# Patient Record
Sex: FEMALE | Race: WHITE | Employment: FULL TIME | URBAN - METROPOLITAN AREA
[De-identification: names, ages, dates, MRNs, and addresses within clinical notes are randomized per-mention and may not be internally consistent; named-entity substitution may affect disease eponyms.]

---

## 2017-01-03 RX ORDER — NORGESTIMATE AND ETHINYL ESTRADIOL 7DAYSX3 28
1 KIT ORAL DAILY
Qty: 28 TABLET | Refills: 1 | Status: SHIPPED | OUTPATIENT
Start: 2017-01-03 | End: 2017-03-01 | Stop reason: SDUPTHER

## 2017-02-27 ENCOUNTER — TELEPHONE (OUTPATIENT)
Dept: OBGYN CLINIC | Age: 37
End: 2017-02-27

## 2017-03-08 ENCOUNTER — OFFICE VISIT (OUTPATIENT)
Dept: OBGYN | Facility: CLINIC | Age: 37
End: 2017-03-08

## 2017-03-08 VITALS
WEIGHT: 189.9 LBS | BODY MASS INDEX: 30.52 KG/M2 | SYSTOLIC BLOOD PRESSURE: 116 MMHG | DIASTOLIC BLOOD PRESSURE: 79 MMHG | HEART RATE: 91 BPM | HEIGHT: 66 IN

## 2017-03-08 DIAGNOSIS — Z01.818 PRE-OP EXAM: Primary | ICD-10-CM

## 2017-03-08 DIAGNOSIS — R10.2 ADNEXAL TENDERNESS, LEFT: ICD-10-CM

## 2017-03-08 PROCEDURE — 99213 OFFICE O/P EST LOW 20 MIN: CPT | Performed by: OBSTETRICS & GYNECOLOGY

## 2017-03-14 ENCOUNTER — HOSPITAL ENCOUNTER (OUTPATIENT)
Dept: ULTRASOUND IMAGING | Age: 37
Discharge: HOME | End: 2017-03-14
Payer: COMMERCIAL

## 2017-03-14 DIAGNOSIS — Z01.818 PRE-OP EXAM: ICD-10-CM

## 2017-03-14 DIAGNOSIS — R10.2 ADNEXAL TENDERNESS, LEFT: ICD-10-CM

## 2017-03-14 PROCEDURE — 76830 TRANSVAGINAL US NON-OB: CPT

## 2017-03-22 ENCOUNTER — TELEPHONE (OUTPATIENT)
Dept: OBGYN CLINIC | Age: 37
End: 2017-03-22

## 2017-04-06 ENCOUNTER — TELEPHONE (OUTPATIENT)
Dept: OBGYN CLINIC | Age: 37
End: 2017-04-06

## 2017-04-20 ENCOUNTER — OFFICE VISIT (OUTPATIENT)
Dept: FAMILY MEDICINE CLINIC | Age: 37
End: 2017-04-20
Payer: COMMERCIAL

## 2017-04-20 VITALS
HEIGHT: 66 IN | BODY MASS INDEX: 30.86 KG/M2 | TEMPERATURE: 97.4 F | HEART RATE: 97 BPM | OXYGEN SATURATION: 98 % | WEIGHT: 192 LBS | SYSTOLIC BLOOD PRESSURE: 124 MMHG | DIASTOLIC BLOOD PRESSURE: 82 MMHG

## 2017-04-20 DIAGNOSIS — F32.A DEPRESSION, UNSPECIFIED DEPRESSION TYPE: Primary | ICD-10-CM

## 2017-04-20 DIAGNOSIS — M54.41 CHRONIC LOW BACK PAIN WITH RIGHT-SIDED SCIATICA, UNSPECIFIED BACK PAIN LATERALITY: ICD-10-CM

## 2017-04-20 DIAGNOSIS — F41.9 ANXIETY: ICD-10-CM

## 2017-04-20 DIAGNOSIS — G89.29 CHRONIC LOW BACK PAIN WITH RIGHT-SIDED SCIATICA, UNSPECIFIED BACK PAIN LATERALITY: ICD-10-CM

## 2017-04-20 PROCEDURE — 96127 BRIEF EMOTIONAL/BEHAV ASSMT: CPT | Performed by: FAMILY MEDICINE

## 2017-04-20 PROCEDURE — 99213 OFFICE O/P EST LOW 20 MIN: CPT | Performed by: FAMILY MEDICINE

## 2017-04-20 RX ORDER — BUSPIRONE HYDROCHLORIDE 10 MG/1
10 TABLET ORAL 3 TIMES DAILY
Qty: 90 TABLET | Refills: 0 | Status: SHIPPED | OUTPATIENT
Start: 2017-04-20 | End: 2017-04-25

## 2017-04-20 RX ORDER — VENLAFAXINE HYDROCHLORIDE 150 MG/1
150 CAPSULE, EXTENDED RELEASE ORAL DAILY
Qty: 90 CAPSULE | Refills: 1 | Status: SHIPPED | OUTPATIENT
Start: 2017-04-20 | End: 2017-05-10 | Stop reason: SDUPTHER

## 2017-04-20 RX ORDER — MELOXICAM 15 MG/1
15 TABLET ORAL DAILY
Qty: 30 TABLET | Refills: 3 | Status: SHIPPED | OUTPATIENT
Start: 2017-04-20 | End: 2017-09-05 | Stop reason: SDUPTHER

## 2017-04-20 ASSESSMENT — PATIENT HEALTH QUESTIONNAIRE - PHQ9
8. MOVING OR SPEAKING SO SLOWLY THAT OTHER PEOPLE COULD HAVE NOTICED. OR THE OPPOSITE, BEING SO FIGETY OR RESTLESS THAT YOU HAVE BEEN MOVING AROUND A LOT MORE THAN USUAL: 1
1. LITTLE INTEREST OR PLEASURE IN DOING THINGS: 3
6. FEELING BAD ABOUT YOURSELF - OR THAT YOU ARE A FAILURE OR HAVE LET YOURSELF OR YOUR FAMILY DOWN: 3
5. POOR APPETITE OR OVEREATING: 3
2. FEELING DOWN, DEPRESSED OR HOPELESS: 3
9. THOUGHTS THAT YOU WOULD BE BETTER OFF DEAD, OR OF HURTING YOURSELF: 0
SUM OF ALL RESPONSES TO PHQ9 QUESTIONS 1 & 2: 6
10. IF YOU CHECKED OFF ANY PROBLEMS, HOW DIFFICULT HAVE THESE PROBLEMS MADE IT FOR YOU TO DO YOUR WORK, TAKE CARE OF THINGS AT HOME, OR GET ALONG WITH OTHER PEOPLE: 1
7. TROUBLE CONCENTRATING ON THINGS, SUCH AS READING THE NEWSPAPER OR WATCHING TELEVISION: 3
4. FEELING TIRED OR HAVING LITTLE ENERGY: 3
SUM OF ALL RESPONSES TO PHQ QUESTIONS 1-9: 22
3. TROUBLE FALLING OR STAYING ASLEEP: 3

## 2017-04-20 ASSESSMENT — ENCOUNTER SYMPTOMS
NAUSEA: 0
COUGH: 0
SHORTNESS OF BREATH: 0
VOMITING: 0

## 2017-04-25 ENCOUNTER — OFFICE VISIT (OUTPATIENT)
Dept: FAMILY MEDICINE CLINIC | Age: 37
End: 2017-04-25
Payer: COMMERCIAL

## 2017-04-25 VITALS
HEART RATE: 95 BPM | DIASTOLIC BLOOD PRESSURE: 80 MMHG | TEMPERATURE: 98.9 F | BODY MASS INDEX: 28.93 KG/M2 | HEIGHT: 66 IN | SYSTOLIC BLOOD PRESSURE: 132 MMHG | WEIGHT: 180 LBS

## 2017-04-25 DIAGNOSIS — F32.A DEPRESSION, UNSPECIFIED DEPRESSION TYPE: Primary | ICD-10-CM

## 2017-04-25 DIAGNOSIS — F41.9 ANXIETY: ICD-10-CM

## 2017-04-25 PROCEDURE — 99213 OFFICE O/P EST LOW 20 MIN: CPT | Performed by: FAMILY MEDICINE

## 2017-04-25 RX ORDER — ALPRAZOLAM 0.5 MG/1
0.5 TABLET ORAL NIGHTLY PRN
Qty: 25 TABLET | Refills: 0 | Status: SHIPPED | OUTPATIENT
Start: 2017-04-25 | End: 2017-05-24 | Stop reason: SDUPTHER

## 2017-04-25 ASSESSMENT — ENCOUNTER SYMPTOMS
VOMITING: 0
COUGH: 0
SHORTNESS OF BREATH: 0
NAUSEA: 0

## 2017-05-10 ENCOUNTER — OFFICE VISIT (OUTPATIENT)
Dept: FAMILY MEDICINE CLINIC | Age: 37
End: 2017-05-10
Payer: COMMERCIAL

## 2017-05-10 VITALS
DIASTOLIC BLOOD PRESSURE: 75 MMHG | BODY MASS INDEX: 30.86 KG/M2 | OXYGEN SATURATION: 93 % | SYSTOLIC BLOOD PRESSURE: 111 MMHG | HEART RATE: 85 BPM | HEIGHT: 66 IN | TEMPERATURE: 98 F | WEIGHT: 192 LBS | RESPIRATION RATE: 16 BRPM

## 2017-05-10 DIAGNOSIS — F32.A DEPRESSION, UNSPECIFIED DEPRESSION TYPE: Primary | ICD-10-CM

## 2017-05-10 PROCEDURE — 99213 OFFICE O/P EST LOW 20 MIN: CPT | Performed by: FAMILY MEDICINE

## 2017-05-10 RX ORDER — VENLAFAXINE HYDROCHLORIDE 37.5 MG/1
37.5 CAPSULE, EXTENDED RELEASE ORAL DAILY
Qty: 30 CAPSULE | Refills: 3 | Status: SHIPPED | OUTPATIENT
Start: 2017-05-10 | End: 2017-11-01 | Stop reason: SDUPTHER

## 2017-05-10 RX ORDER — VENLAFAXINE HYDROCHLORIDE 150 MG/1
150 CAPSULE, EXTENDED RELEASE ORAL DAILY
Qty: 90 CAPSULE | Refills: 1 | OUTPATIENT
Start: 2017-05-10 | End: 2017-11-01 | Stop reason: SDUPTHER

## 2017-05-10 ASSESSMENT — ENCOUNTER SYMPTOMS
NAUSEA: 0
SHORTNESS OF BREATH: 0
COUGH: 0
VOMITING: 0

## 2017-05-24 ENCOUNTER — OFFICE VISIT (OUTPATIENT)
Dept: FAMILY MEDICINE CLINIC | Age: 37
End: 2017-05-24
Payer: COMMERCIAL

## 2017-05-24 ENCOUNTER — OFFICE VISIT (OUTPATIENT)
Dept: BEHAVIORAL/MENTAL HEALTH CLINIC | Age: 37
End: 2017-05-24
Payer: COMMERCIAL

## 2017-05-24 VITALS
HEART RATE: 97 BPM | SYSTOLIC BLOOD PRESSURE: 137 MMHG | RESPIRATION RATE: 15 BRPM | WEIGHT: 197.2 LBS | BODY MASS INDEX: 34.94 KG/M2 | DIASTOLIC BLOOD PRESSURE: 89 MMHG | TEMPERATURE: 97.6 F | OXYGEN SATURATION: 98 % | HEIGHT: 63 IN

## 2017-05-24 DIAGNOSIS — F33.1 DEPRESSION, MAJOR, RECURRENT, MODERATE (HCC): Primary | ICD-10-CM

## 2017-05-24 DIAGNOSIS — F32.A DEPRESSION, UNSPECIFIED DEPRESSION TYPE: Primary | ICD-10-CM

## 2017-05-24 DIAGNOSIS — F41.9 ANXIETY: ICD-10-CM

## 2017-05-24 PROCEDURE — 90791 PSYCH DIAGNOSTIC EVALUATION: CPT | Performed by: SOCIAL WORKER

## 2017-05-24 PROCEDURE — 99213 OFFICE O/P EST LOW 20 MIN: CPT | Performed by: FAMILY MEDICINE

## 2017-05-24 RX ORDER — ALPRAZOLAM 0.5 MG/1
0.5 TABLET ORAL NIGHTLY PRN
Qty: 25 TABLET | Refills: 0 | Status: SHIPPED | OUTPATIENT
Start: 2017-05-24 | End: 2017-06-23

## 2017-05-24 ASSESSMENT — ENCOUNTER SYMPTOMS
VOMITING: 0
NAUSEA: 0
SHORTNESS OF BREATH: 0
COUGH: 0

## 2017-05-24 ASSESSMENT — PATIENT HEALTH QUESTIONNAIRE - PHQ9
9. THOUGHTS THAT YOU WOULD BE BETTER OFF DEAD, OR OF HURTING YOURSELF: 0
3. TROUBLE FALLING OR STAYING ASLEEP: 2
SUM OF ALL RESPONSES TO PHQ9 QUESTIONS 1 & 2: 4
5. POOR APPETITE OR OVEREATING: 2
7. TROUBLE CONCENTRATING ON THINGS, SUCH AS READING THE NEWSPAPER OR WATCHING TELEVISION: 2
SUM OF ALL RESPONSES TO PHQ QUESTIONS 1-9: 16
8. MOVING OR SPEAKING SO SLOWLY THAT OTHER PEOPLE COULD HAVE NOTICED. OR THE OPPOSITE, BEING SO FIGETY OR RESTLESS THAT YOU HAVE BEEN MOVING AROUND A LOT MORE THAN USUAL: 1
6. FEELING BAD ABOUT YOURSELF - OR THAT YOU ARE A FAILURE OR HAVE LET YOURSELF OR YOUR FAMILY DOWN: 3
1. LITTLE INTEREST OR PLEASURE IN DOING THINGS: 2
2. FEELING DOWN, DEPRESSED OR HOPELESS: 2
4. FEELING TIRED OR HAVING LITTLE ENERGY: 2
10. IF YOU CHECKED OFF ANY PROBLEMS, HOW DIFFICULT HAVE THESE PROBLEMS MADE IT FOR YOU TO DO YOUR WORK, TAKE CARE OF THINGS AT HOME, OR GET ALONG WITH OTHER PEOPLE: 2

## 2017-06-07 ENCOUNTER — INITIAL CONSULT (OUTPATIENT)
Dept: BEHAVIORAL/MENTAL HEALTH CLINIC | Age: 37
End: 2017-06-07
Payer: COMMERCIAL

## 2017-06-07 DIAGNOSIS — F33.1 DEPRESSION, MAJOR, RECURRENT, MODERATE (HCC): Primary | ICD-10-CM

## 2017-06-07 PROCEDURE — 90837 PSYTX W PT 60 MINUTES: CPT | Performed by: SOCIAL WORKER

## 2017-06-21 ENCOUNTER — INITIAL CONSULT (OUTPATIENT)
Dept: BEHAVIORAL/MENTAL HEALTH CLINIC | Age: 37
End: 2017-06-21
Payer: COMMERCIAL

## 2017-06-21 DIAGNOSIS — F33.1 DEPRESSION, MAJOR, RECURRENT, MODERATE (HCC): Primary | ICD-10-CM

## 2017-06-21 PROCEDURE — 90837 PSYTX W PT 60 MINUTES: CPT | Performed by: SOCIAL WORKER

## 2017-08-01 ENCOUNTER — OFFICE VISIT (OUTPATIENT)
Dept: FAMILY MEDICINE CLINIC | Age: 37
End: 2017-08-01
Payer: COMMERCIAL

## 2017-08-01 VITALS
HEART RATE: 80 BPM | WEIGHT: 204.4 LBS | SYSTOLIC BLOOD PRESSURE: 133 MMHG | RESPIRATION RATE: 20 BRPM | TEMPERATURE: 98.3 F | HEIGHT: 66 IN | BODY MASS INDEX: 32.85 KG/M2 | DIASTOLIC BLOOD PRESSURE: 86 MMHG

## 2017-08-01 DIAGNOSIS — F32.A ANXIETY AND DEPRESSION: Primary | ICD-10-CM

## 2017-08-01 DIAGNOSIS — F41.9 ANXIETY AND DEPRESSION: Primary | ICD-10-CM

## 2017-08-01 PROCEDURE — 99213 OFFICE O/P EST LOW 20 MIN: CPT | Performed by: FAMILY MEDICINE

## 2017-08-01 RX ORDER — ALPRAZOLAM 0.5 MG/1
0.5 TABLET ORAL NIGHTLY PRN
COMMUNITY
End: 2017-08-01 | Stop reason: SDUPTHER

## 2017-08-01 RX ORDER — ALPRAZOLAM 0.5 MG/1
0.5 TABLET ORAL NIGHTLY PRN
Qty: 25 TABLET | Refills: 0 | Status: SHIPPED | OUTPATIENT
Start: 2017-08-01 | End: 2017-11-01 | Stop reason: SDUPTHER

## 2017-08-01 ASSESSMENT — ENCOUNTER SYMPTOMS
NAUSEA: 0
COUGH: 0
VOMITING: 0
SHORTNESS OF BREATH: 0

## 2017-09-05 DIAGNOSIS — M54.41 CHRONIC LOW BACK PAIN WITH RIGHT-SIDED SCIATICA, UNSPECIFIED BACK PAIN LATERALITY: Primary | ICD-10-CM

## 2017-09-05 DIAGNOSIS — G89.29 CHRONIC LOW BACK PAIN WITH RIGHT-SIDED SCIATICA, UNSPECIFIED BACK PAIN LATERALITY: Primary | ICD-10-CM

## 2017-09-05 RX ORDER — MELOXICAM 15 MG/1
15 TABLET ORAL DAILY PRN
Qty: 30 TABLET | Refills: 3 | Status: SHIPPED | OUTPATIENT
Start: 2017-09-05 | End: 2017-11-01 | Stop reason: SDUPTHER

## 2017-09-05 RX ORDER — ALPRAZOLAM 0.5 MG/1
0.5 TABLET ORAL NIGHTLY PRN
Qty: 25 TABLET | Refills: 0 | OUTPATIENT
Start: 2017-09-05

## 2017-11-01 ENCOUNTER — OFFICE VISIT (OUTPATIENT)
Dept: FAMILY MEDICINE CLINIC | Age: 37
End: 2017-11-01

## 2017-11-01 ENCOUNTER — HOSPITAL ENCOUNTER (OUTPATIENT)
Age: 37
Setting detail: SPECIMEN
Discharge: HOME | End: 2017-11-01
Payer: COMMERCIAL

## 2017-11-01 VITALS
DIASTOLIC BLOOD PRESSURE: 80 MMHG | BODY MASS INDEX: 32.68 KG/M2 | SYSTOLIC BLOOD PRESSURE: 128 MMHG | HEIGHT: 67 IN | OXYGEN SATURATION: 97 % | TEMPERATURE: 96.2 F | WEIGHT: 208.2 LBS | RESPIRATION RATE: 20 BRPM | HEART RATE: 91 BPM

## 2017-11-01 DIAGNOSIS — G89.29 CHRONIC LOW BACK PAIN WITH RIGHT-SIDED SCIATICA, UNSPECIFIED BACK PAIN LATERALITY: ICD-10-CM

## 2017-11-01 DIAGNOSIS — F41.9 ANXIETY AND DEPRESSION: Primary | ICD-10-CM

## 2017-11-01 DIAGNOSIS — M54.41 CHRONIC LOW BACK PAIN WITH RIGHT-SIDED SCIATICA, UNSPECIFIED BACK PAIN LATERALITY: ICD-10-CM

## 2017-11-01 DIAGNOSIS — F32.A ANXIETY AND DEPRESSION: Primary | ICD-10-CM

## 2017-11-01 DIAGNOSIS — Z13.31 POSITIVE DEPRESSION SCREENING: ICD-10-CM

## 2017-11-01 LAB
AMPHETAMINE SCREEN URINE: NEGATIVE
AMPHETAMINE SCREEN, URINE: NORMAL
BARBITURATE SCREEN URINE: NEGATIVE
BARBITURATE SCREEN, URINE: NORMAL
BENZODIAZEPINE SCREEN, URINE: NEGATIVE
BENZODIAZEPINE SCREEN, URINE: NORMAL
BUPRENORPHINE URINE: NORMAL
CANNABINOID SCREEN URINE: NEGATIVE
COCAINE METABOLITE SCREEN URINE: NORMAL
COCAINE METABOLITE, URINE: NEGATIVE
MDMA URINE: NORMAL
MDMA URINE: NORMAL
METHADONE SCREEN, URINE: NEGATIVE
METHADONE SCREEN, URINE: NORMAL
METHAMPHETAMINE, URINE: NORMAL
METHAMPHETAMINE, URINE: NORMAL
OPIATE SCREEN URINE: NORMAL
OPIATES, URINE: NEGATIVE
OXYCODONE SCREEN URINE: NEGATIVE
OXYCODONE SCREEN URINE: NORMAL
PHENCYCLIDINE SCREEN URINE: NORMAL
PHENCYCLIDINE, URINE: NEGATIVE
PROPOXYPHENE SCREEN, URINE: NORMAL
PROPOXYPHENE, URINE: NORMAL
TEST INFORMATION: NORMAL
THC: NORMAL
TRICYCLIC ANTIDEPRESSANTS, UR: NORMAL
TRICYCLIC ANTIDEPRESSANTS, UR: NORMAL

## 2017-11-01 PROCEDURE — 99213 OFFICE O/P EST LOW 20 MIN: CPT | Performed by: FAMILY MEDICINE

## 2017-11-01 PROCEDURE — 80305 DRUG TEST PRSMV DIR OPT OBS: CPT | Performed by: FAMILY MEDICINE

## 2017-11-01 RX ORDER — ALPRAZOLAM 0.5 MG/1
0.5 TABLET ORAL NIGHTLY PRN
Qty: 10 TABLET | Refills: 0 | Status: SHIPPED | OUTPATIENT
Start: 2017-11-01 | End: 2018-01-31 | Stop reason: ALTCHOICE

## 2017-11-01 RX ORDER — MELOXICAM 15 MG/1
15 TABLET ORAL DAILY PRN
Qty: 30 TABLET | Refills: 3 | Status: SHIPPED | OUTPATIENT
Start: 2017-11-01 | End: 2018-03-12 | Stop reason: SDUPTHER

## 2017-11-01 RX ORDER — VENLAFAXINE HYDROCHLORIDE 37.5 MG/1
37.5 CAPSULE, EXTENDED RELEASE ORAL DAILY
Qty: 30 CAPSULE | Refills: 3 | Status: SHIPPED | OUTPATIENT
Start: 2017-11-01 | End: 2018-03-12 | Stop reason: SDUPTHER

## 2017-11-01 RX ORDER — VENLAFAXINE HYDROCHLORIDE 150 MG/1
150 CAPSULE, EXTENDED RELEASE ORAL DAILY
Qty: 90 CAPSULE | Refills: 1 | Status: SHIPPED | OUTPATIENT
Start: 2017-11-01 | End: 2018-04-03 | Stop reason: SDUPTHER

## 2017-11-01 ASSESSMENT — ENCOUNTER SYMPTOMS: BACK PAIN: 0

## 2017-11-01 ASSESSMENT — PATIENT HEALTH QUESTIONNAIRE - PHQ9
SUM OF ALL RESPONSES TO PHQ9 QUESTIONS 1 & 2: 3
SUM OF ALL RESPONSES TO PHQ QUESTIONS 1-9: 3
2. FEELING DOWN, DEPRESSED OR HOPELESS: 1
1. LITTLE INTEREST OR PLEASURE IN DOING THINGS: 2

## 2017-11-01 NOTE — PROGRESS NOTES
Chief Complaint   Patient presents with    Anxiety    Depression         [address] [facility]  here today for follow up on chronic medical problems, go over labs and/or diagnostic studies, and medication refills. Anxiety and Depression      HPI:Patient is here for follow-up on anxiety and depression, on Effexor and Xanax. Patient reports she was getting Xanax from her PCP and 25-30 tablets a month and takes only as needed. Patient reports she takes Xanax) for her sleep. She has tried multiple medications in the past and they did not work. She has tried trazodone, Zoloft, Lunesta and melatonin. Nothing worked for her. Patient reports she is getting under lot of stress and is also facing divorce. -She walks in rehab as a nurse, and knows about these medications and their side effects. Patient reports she has no insurance at this point and she cannot afford to seeing behavioral therapist and psychiatrist at this point. She denies any suicide or homicidal thoughts. /80   Pulse 91   Temp 96.2 °F (35.7 °C) (Tympanic)   Resp 20   Ht 5' 6.5\" (1.689 m)   Wt 208 lb 3.2 oz (94.4 kg)   LMP 10/11/2017 (Exact Date)   SpO2 97%   BMI 33.10 kg/m²   Body mass index is 33.1 kg/m². Wt Readings from Last 3 Encounters:   11/01/17 208 lb 3.2 oz (94.4 kg)   08/01/17 204 lb 6.4 oz (92.7 kg)   05/24/17 197 lb 3.2 oz (89.4 kg)        []Negative depression screening. PHQ Scores 11/1/2017 5/24/2017 4/20/2017 11/11/2015 6/22/2015 3/20/2015   PHQ2 Score 3 4 6 0 3 6   PHQ9 Score 3 16 22 0 13 18      []1-4 = Minimal depression   [x]5-9 = Mild depression   []10-14 = Moderate depression   []15-19 = Moderately severe depression   []20-27 = Severe depression    Discussed testing with the patient and all questions fully answered.     Hospital Outpatient Visit on 11/11/2015   Component Date Value Ref Range Status    Cytology Report 11/23/2015    Final                    Value:(NOTE)  [id number]  [facility]  ANATOMIC PATHOLOGY  [address] [place] [address], [facility] [address]. [facility], [address]  [phone]  Fax: [phone] [address] REPORT    Patient Name: Elisabet Corbett  MR#: [id number]  Specimen #[id number]  Source:  1: CERVICAL MATERIAL, (THIN PREP VIAL)    Clinical History  Z01.419 Routine gyn exam without abnormal findings  Z11.51 Encounter for screening for HPV  Regardless of pap result, high risk HPV DNA testing is requested  Date of prior pap: 2012  No prior abnormal findings  LMP:  10/23/15  INTERPRETATION    CERVICAL MATERIAL, (THIN PREP VIAL):  Specimen Adequacy:      Satisfactory for evaluation.      - Endocervical/transformation zone component present. - Partially obscuring exudate. Descriptive Diagnosis:      Negative for intraepithelial lesion or malignancy. Comments:      High Risk HPV testing was ordered. Cytotechnologist:   WILMER Hurley(ASCP)  **Electronically Signed Out**                            mk/11/23/2015          Procedure/Addendum  HPV Procedure Report     Date Ordered:     11/12/2015     Status:  Signed Out      Date Complete:     11/12/2015     By: WILMER York(ASCP)      Date Reported:     11/23/2015       INTERPRETATION  Roche HPV DNA High Risk                                 HPV Sample               Thin Prep                    (Ref Range)  HPV Type 16               Not Detected                    (Not  Detected)  HPV Type 18               Not Detected                    (Not  Detected)  Other High Risk HPV     Not Detected                    (Not Detected)      This test amplifies and detects DNA of 14 high-risk HPV types  associated with cervical cancer and its precursor lesions (HPV types  16, 18, 31, 33, 35, 39, 45, 51, 52, 56, 58, 59, 66, and 68). Sensitivity may be affected by specimen collection methods, stage of  infection, and the presence of interfering substances.   Results should  be interpreted in conjunction with other available laboratory and  cl                          inical data. A negative high-risk HPV result does not exclude the  possibility of future cytologic HSIL or underlying CIN2-3 or cancer. This test is intended for medical purposes only and is not valid for  the evaluation of suspected sexual abuse or for other forensic  purposes.  HPV SOURCE 11/16/2015 CERVICAL MATERIAL   Final    HPV Sample 11/16/2015 THIN PREP   Final    HPV, Genotype 16 11/16/2015 Not Detected  NOTDET Final    HPV, Genotype 18 11/16/2015 Not Detected  NOTDET Final    HPV, High Risk Other 11/16/2015 Not Detected  NOTDET Final    HPV, Interpretation 11/16/2015        Final    Comment: This test amplifies and detects DNA of 14 high-risk HPV types associated with   cervical cancer and its precursor lesions (HPV types 16,18, 31, 33, 35, 39, 45,   51, 52, 56, 58, 59, 66, and 68). Sensitivity may be affected by specimen collection methods, stage of infection,   and the presence of interfering substances. Results should be interpreted in conjunction with other available laboratory and   clinical data. A negative high-risk HPV result does not exclude the possibility of future   cytologic HSIL or underlying CIN2-3 or cancer. This test is intended for medical purposes only and is not valid for the   evaluation of suspected sexual abuse or for other forensic purposes.   Performed at [facility] [address], [address] [phone]           Most recent labs reviewed:     Lab Results   Component Value Date    WBC 4.2 09/02/2015    HGB 13.1 09/02/2015    HCT 37.9 09/02/2015    MCV 87.9 09/02/2015     09/02/2015       Lab Results   Component Value Date     09/02/2015    K 4.0 09/02/2015     09/02/2015    CO2 25 09/02/2015    BUN 11 09/02/2015    CREATININE 0.69 09/02/2015    GLUCOSE 98 09/02/2015    CALCIUM 9.0 09/02/2015        Lab Results   Component Value Date ALT 12 09/02/2015    AST 12 09/02/2015    ALKPHOS 49 09/02/2015    BILITOT 0.42 09/02/2015       Lab Results   Component Value Date    TSH 1.12 08/17/2015       Lab Results   Component Value Date    CHOL 158 09/02/2015     Lab Results   Component Value Date    TRIG 83 09/02/2015     Lab Results   Component Value Date    HDL 41 09/02/2015     Lab Results   Component Value Date    LDLCHOLESTEROL 100 09/02/2015     Lab Results   Component Value Date    VLDL NOT REPORTED 09/02/2015     Lab Results   Component Value Date    CHOLHDLRATIO 3.9 09/02/2015       Lab Results   Component Value Date    LABA1C 5.1 09/02/2015       No results found for: [id number]    No results found for: FOLATE    No results found for: IRON, TIBC, FERRITIN    No results found for: VITD25          Current Outpatient Prescriptions   Medication Sig Dispense Refill    ALPRAZolam (XANAX) 0.5 MG tablet Take 1 tablet by mouth nightly as needed for Sleep or Anxiety 10 tablet 0    meloxicam (MOBIC) 15 MG tablet Take 1 tablet by mouth daily as needed for Pain (back pain) 30 tablet 3    venlafaxine (EFFEXOR XR) 150 MG extended release capsule Take 1 capsule by mouth daily Along with 37.5 mg in morning 90 capsule 1    venlafaxine (EFFEXOR XR) 37.5 MG extended release capsule Take 1 capsule by mouth daily In morning along with 150mg Xr in evening 30 capsule 3    TRI-SPRINTEC 0.18/0.215/0.25 MG-35 MCG TABS TAKE ONE TABLET BY MOUTH DAILY 28 tablet 11     No current facility-administered medications for this visit. Social History     Social History    Marital status: Legally      Spouse name: N/A    Number of children: N/A    Years of education: N/A     Occupational History    Not on file.      Social History Main Topics    Smoking status: Never Smoker    Smokeless tobacco: Never Used    Alcohol use No    Drug use: No    Sexual activity: Yes     Partners: Male     Other Topics Concern    Not on file     Social History extremities. No tenderness to   palpation of the ribs, long bones, or spine. NEUROLOGIC: Cranial nerves II through XII grossly intact. No focal deficits are noted. ASSESSMENT AND PLAN      1. Anxiety and depression  -Discussed in detail with patient, about benzodiazepines and independence. Patient reports she is not dependent and she takes only as needed. Discussed with patient I will support long-term will get the insurance and see his psychiatrist.  Decrease Xanax to 10 tablets for one month as needed. Continue other medications. Drug screen is negative for benzodiazepines or any other illicit drugs. We'll send 1 for lab. - ALPRAZolam (XANAX) 0.5 MG tablet; Take 1 tablet by mouth nightly as needed for Sleep or Anxiety  Dispense: 10 tablet; Refill: 0  - venlafaxine (EFFEXOR XR) 150 MG extended release capsule; Take 1 capsule by mouth daily Along with 37.5 mg in morning  Dispense: 90 capsule; Refill: 1  - venlafaxine (EFFEXOR XR) 37.5 MG extended release capsule; Take 1 capsule by mouth daily In morning along with 150mg Xr in evening  Dispense: 30 capsule; Refill: 3  - POCT Rapid Drug Screen  - Urine Drug Screen    2. Chronic low back pain with right-sided sciatica, unspecified back pain laterality  -Stable continue Mobic as needed for pain  - meloxicam (MOBIC) 15 MG tablet; Take 1 tablet by mouth daily as needed for Pain (back pain)  Dispense: 30 tablet;  Refill: 3        Orders Placed This Encounter   Procedures    Urine Drug Screen    POCT Rapid Drug Screen         Medications Discontinued During This Encounter   Medication Reason    ALPRAZolam (XANAX) 0.5 MG tablet Reorder    meloxicam (MOBIC) 15 MG tablet Reorder    venlafaxine (EFFEXOR XR) 150 MG extended release capsule Reorder    venlafaxine (EFFEXOR XR) 37.5 MG extended release capsule Reorder       Dyan Chambers received counseling on the following healthy behaviors: nutrition, exercise, medication adherence and tobacco cessation  Reviewed in this encounter. Return in about 3 months (around 2/1/2018) for for anxiety , depression , psych refferal , sign contract . Patient was seen with total face to face time of 15 minutes. More than 50% of this visit was counseling and education. Future Appointments  Date Time Provider Peggy Rivka   1/31/2018 9:30 AM [facility]     This note was completed by using the assistance of a speech-recognition program. However, inadvertent computerized transcription errors may be present. Although every effort was made to ensure accuracy, no guarantees can be provided that every mistake has been identified and corrected by editing. Electronically signed by Sweta Cabello MD on 11/1/2017  11:07 AM              On the basis of positive PHQ-9 screening (PHQ-9 Total Score: 3), the following plan was implemented: referral for grief counseling provided and pt is on treatment . Patient will follow-up in 3 month(s) with PCP.

## 2018-01-31 ENCOUNTER — OFFICE VISIT (OUTPATIENT)
Dept: FAMILY MEDICINE CLINIC | Age: 38
End: 2018-01-31
Payer: MEDICARE

## 2018-01-31 VITALS
TEMPERATURE: 98.4 F | SYSTOLIC BLOOD PRESSURE: 133 MMHG | DIASTOLIC BLOOD PRESSURE: 84 MMHG | HEIGHT: 67 IN | OXYGEN SATURATION: 98 % | BODY MASS INDEX: 33.27 KG/M2 | HEART RATE: 82 BPM | RESPIRATION RATE: 20 BRPM | WEIGHT: 212 LBS

## 2018-01-31 DIAGNOSIS — L02.92 BOILS: ICD-10-CM

## 2018-01-31 DIAGNOSIS — F41.9 ANXIETY AND DEPRESSION: Primary | ICD-10-CM

## 2018-01-31 DIAGNOSIS — F32.A ANXIETY AND DEPRESSION: Primary | ICD-10-CM

## 2018-01-31 DIAGNOSIS — Z13.31 POSITIVE DEPRESSION SCREENING: ICD-10-CM

## 2018-01-31 PROCEDURE — 99213 OFFICE O/P EST LOW 20 MIN: CPT | Performed by: FAMILY MEDICINE

## 2018-01-31 PROCEDURE — G8417 CALC BMI ABV UP PARAM F/U: HCPCS | Performed by: FAMILY MEDICINE

## 2018-01-31 PROCEDURE — G8427 DOCREV CUR MEDS BY ELIG CLIN: HCPCS | Performed by: FAMILY MEDICINE

## 2018-01-31 PROCEDURE — 1036F TOBACCO NON-USER: CPT | Performed by: FAMILY MEDICINE

## 2018-01-31 PROCEDURE — G8431 POS CLIN DEPRES SCRN F/U DOC: HCPCS | Performed by: FAMILY MEDICINE

## 2018-01-31 PROCEDURE — G8484 FLU IMMUNIZE NO ADMIN: HCPCS | Performed by: FAMILY MEDICINE

## 2018-01-31 RX ORDER — ARIPIPRAZOLE 5 MG/1
5 TABLET ORAL DAILY
Qty: 30 TABLET | Refills: 3 | Status: SHIPPED | OUTPATIENT
Start: 2018-01-31 | End: 2018-04-03 | Stop reason: SDUPTHER

## 2018-01-31 RX ORDER — DOXYCYCLINE HYCLATE 100 MG
100 TABLET ORAL 2 TIMES DAILY
Qty: 20 TABLET | Refills: 0 | Status: SHIPPED | OUTPATIENT
Start: 2018-01-31 | End: 2018-09-17 | Stop reason: ALTCHOICE

## 2018-01-31 ASSESSMENT — PATIENT HEALTH QUESTIONNAIRE - PHQ9
SUM OF ALL RESPONSES TO PHQ QUESTIONS 1-9: 22
7. TROUBLE CONCENTRATING ON THINGS, SUCH AS READING THE NEWSPAPER OR WATCHING TELEVISION: 3
5. POOR APPETITE OR OVEREATING: 3
SUM OF ALL RESPONSES TO PHQ9 QUESTIONS 1 & 2: 5
10. IF YOU CHECKED OFF ANY PROBLEMS, HOW DIFFICULT HAVE THESE PROBLEMS MADE IT FOR YOU TO DO YOUR WORK, TAKE CARE OF THINGS AT HOME, OR GET ALONG WITH OTHER PEOPLE: 2
9. THOUGHTS THAT YOU WOULD BE BETTER OFF DEAD, OR OF HURTING YOURSELF: 0
4. FEELING TIRED OR HAVING LITTLE ENERGY: 3
1. LITTLE INTEREST OR PLEASURE IN DOING THINGS: 2
8. MOVING OR SPEAKING SO SLOWLY THAT OTHER PEOPLE COULD HAVE NOTICED. OR THE OPPOSITE, BEING SO FIGETY OR RESTLESS THAT YOU HAVE BEEN MOVING AROUND A LOT MORE THAN USUAL: 2
2. FEELING DOWN, DEPRESSED OR HOPELESS: 3
3. TROUBLE FALLING OR STAYING ASLEEP: 3
6. FEELING BAD ABOUT YOURSELF - OR THAT YOU ARE A FAILURE OR HAVE LET YOURSELF OR YOUR FAMILY DOWN: 3

## 2018-01-31 ASSESSMENT — ENCOUNTER SYMPTOMS
BACK PAIN: 0
COUGH: 0
SHORTNESS OF BREATH: 0
SINUS PAIN: 0
RHINORRHEA: 0
WHEEZING: 0
PHOTOPHOBIA: 0

## 2018-01-31 NOTE — PROGRESS NOTES
Comment:       (Positive cutoff 1000 ng/mL)                  Barbiturate Screen, Ur 11/01/2017 NEGATIVE  NEG Final    Comment:       (Positive cutoff 200 ng/mL)                  Benzodiazepine Screen, Urine 11/01/2017 NEGATIVE  NEG Final    Comment:       (Positive cutoff 200 ng/mL)                  Cocaine Metabolite, Urine 11/01/2017 NEGATIVE  NEG Final    Comment:       (Positive cutoff 300 ng/mL)                  Methadone Screen, Urine 11/01/2017 NEGATIVE  NEG Final    Comment:       (Positive cutoff 300 ng/mL)                  Opiates, Urine 11/01/2017 NEGATIVE  NEG Final    Comment:       (Positive cutoff 300 ng/mL)                  Phencyclidine, Urine 11/01/2017 NEGATIVE  NEG Final    Comment:       (Positive cutoff 25 ng/mL)                  Propoxyphene, Urine 11/01/2017 NOT REPORTED  NEG Final    Cannabinoid Scrn, Ur 11/01/2017 NEGATIVE  NEG Final    Comment:       (Positive cutoff 50 ng/mL)                  Oxycodone Screen, Ur 11/01/2017 NEGATIVE  NEG Final    Comment:       (Positive cutoff 100 ng/mL)                  Methamphetamine, Urine 11/01/2017 NOT REPORTED  NEG Final    Tricyclic Antidepressants, Ur 11/01/2017 NOT REPORTED  NEG Final    MDMA URINE 11/01/2017 NOT REPORTED  NEG Final    Buprenorphine Urine 11/01/2017 NOT REPORTED  NEG Final    Test Information 11/01/2017 Assay provides medical screening only. The absence of expected drug(s) and/or   Final    Comment:  metabolite(s) may indicate diluted or adulterated urine, limitations of testing   or timing of collection. Testing for legal purposes should be confirmed by another method. To request   confirmation of test result, please call the lab within 7 days of sample   submission.   [facility] [address] [place] [address], [address] [place] [address] [phone]           Most recent labs reviewed:     Lab Results   Component Value Date    WBC 4.2 09/02/2015    HGB 13.1 09/02/2015    HCT 37.9 09/02/2015    MCV 87.9 09/02/2015     Clinical Depression with a Documented Follow-up Plan          Medications Discontinued During This Encounter   Medication Reason    ALPRAZolam (XANAX) 0.5 MG tablet Therapy completed       Tatiana Holguin received counseling on the following healthy behaviors: nutrition, exercise and medication adherence  Reviewed prior labs and health maintenance  Continue current medications, diet and exercise. Discussed use, benefit, and side effects of prescribed medications. Barriers to medication compliance addressed. Patient given educational materials - see patient instructions  Was a self-tracking handout given in paper form or via mysportgroupt? Yes    Requested Prescriptions     Signed Prescriptions Disp Refills    ARIPiprazole (ABILIFY) 5 MG tablet 30 tablet 3     Sig: Take 1 tablet by mouth daily    doxycycline hyclate (VIBRA-TABS) 100 MG tablet 20 tablet 0     Sig: Take 1 tablet by mouth 2 times daily       All patient questions answered. Patient voiced understanding. Quality Measures    Body mass index is 33.71 kg/m². Elevated. Weight control planned discussed Healthy diet and regular exercise. BP: 133/84 Blood pressure is normal. Treatment plan consists of No treatment change needed. Lab Results   Component Value Date    LDLCHOLESTEROL 100 09/02/2015    (goal LDL reduction with dx if diabetes is 50% LDL reduction)      PHQ Scores 1/31/2018 11/1/2017 5/24/2017 4/20/2017 11/11/2015 6/22/2015 3/20/2015   PHQ2 Score 5 3 4 6 0 3 6   PHQ9 Score 22 3 16 22 0 13 18     Interpretation of Total Score Depression Severity: 1-4 = Minimal depression, 5-9 = Mild depression, 10-14 = Moderate depression, 15-19 = Moderately severe depression, 20-27 = Severe depression      The patient's past medical, surgical, social, and family history as well as her   current medications and allergies were reviewed as documented in today's encounter.       Medications, labs, diagnostic studies, consultations and follow-up as documented in this

## 2018-03-12 DIAGNOSIS — G89.29 CHRONIC LOW BACK PAIN WITH RIGHT-SIDED SCIATICA, UNSPECIFIED BACK PAIN LATERALITY: ICD-10-CM

## 2018-03-12 DIAGNOSIS — M54.41 CHRONIC LOW BACK PAIN WITH RIGHT-SIDED SCIATICA, UNSPECIFIED BACK PAIN LATERALITY: ICD-10-CM

## 2018-03-12 DIAGNOSIS — F41.9 ANXIETY AND DEPRESSION: ICD-10-CM

## 2018-03-12 DIAGNOSIS — F32.A ANXIETY AND DEPRESSION: ICD-10-CM

## 2018-03-12 RX ORDER — VENLAFAXINE HYDROCHLORIDE 37.5 MG/1
37.5 CAPSULE, EXTENDED RELEASE ORAL DAILY
Qty: 30 CAPSULE | Refills: 3 | Status: SHIPPED | OUTPATIENT
Start: 2018-03-12 | End: 2018-04-03 | Stop reason: DRUGHIGH

## 2018-03-12 RX ORDER — MELOXICAM 15 MG/1
15 TABLET ORAL DAILY PRN
Qty: 30 TABLET | Refills: 3 | Status: SHIPPED | OUTPATIENT
Start: 2018-03-12 | End: 2018-07-20 | Stop reason: SDUPTHER

## 2018-03-12 NOTE — TELEPHONE ENCOUNTER
From: Ronda Barron  Sent: 3/12/2018 10:35 AM EDT  Subject: Medication Renewal Request    Andreas Calvin English would like a refill of the following medications:     meloxicam (MOBIC) 15 MG tablet Francisca Rebolledo MD]     venlafaxine (EFFEXOR XR) 37.5 MG extended release capsule Francisca Rebolledo MD]    Preferred pharmacy: [address] [place] [address], [address] [place] [address] [phone] - F [phone]    Comment:

## 2018-04-03 ENCOUNTER — OFFICE VISIT (OUTPATIENT)
Dept: FAMILY MEDICINE CLINIC | Age: 38
End: 2018-04-03
Payer: MEDICARE

## 2018-04-03 ENCOUNTER — OFFICE VISIT (OUTPATIENT)
Dept: BEHAVIORAL/MENTAL HEALTH CLINIC | Age: 38
End: 2018-04-03
Payer: MEDICARE

## 2018-04-03 VITALS
HEART RATE: 103 BPM | DIASTOLIC BLOOD PRESSURE: 87 MMHG | SYSTOLIC BLOOD PRESSURE: 127 MMHG | BODY MASS INDEX: 34.68 KG/M2 | HEIGHT: 66 IN | WEIGHT: 215.8 LBS

## 2018-04-03 VITALS
WEIGHT: 216 LBS | HEART RATE: 94 BPM | BODY MASS INDEX: 34.86 KG/M2 | RESPIRATION RATE: 18 BRPM | SYSTOLIC BLOOD PRESSURE: 125 MMHG | DIASTOLIC BLOOD PRESSURE: 80 MMHG

## 2018-04-03 DIAGNOSIS — F33.41 RECURRENT MAJOR DEPRESSIVE DISORDER, IN PARTIAL REMISSION (HCC): ICD-10-CM

## 2018-04-03 DIAGNOSIS — Z23 NEED FOR PROPHYLACTIC VACCINATION AGAINST DIPHTHERIA-TETANUS-PERTUSSIS (DTP): ICD-10-CM

## 2018-04-03 DIAGNOSIS — M53.3 SACROILIAC JOINT PAIN: ICD-10-CM

## 2018-04-03 DIAGNOSIS — F33.2 MAJOR DEPRESSIVE DISORDER, RECURRENT, SEVERE WITHOUT PSYCHOTIC FEATURES (HCC): Primary | ICD-10-CM

## 2018-04-03 DIAGNOSIS — B35.1 ONYCHOMYCOSIS: ICD-10-CM

## 2018-04-03 DIAGNOSIS — M54.41 CHRONIC LOW BACK PAIN WITH RIGHT-SIDED SCIATICA, UNSPECIFIED BACK PAIN LATERALITY: Primary | ICD-10-CM

## 2018-04-03 DIAGNOSIS — G89.29 CHRONIC LOW BACK PAIN WITH RIGHT-SIDED SCIATICA, UNSPECIFIED BACK PAIN LATERALITY: Primary | ICD-10-CM

## 2018-04-03 PROBLEM — L02.92 BOILS: Status: RESOLVED | Noted: 2018-01-31 | Resolved: 2018-04-03

## 2018-04-03 PROCEDURE — 90471 IMMUNIZATION ADMIN: CPT | Performed by: FAMILY MEDICINE

## 2018-04-03 PROCEDURE — 99214 OFFICE O/P EST MOD 30 MIN: CPT | Performed by: FAMILY MEDICINE

## 2018-04-03 PROCEDURE — 90715 TDAP VACCINE 7 YRS/> IM: CPT | Performed by: FAMILY MEDICINE

## 2018-04-03 PROCEDURE — 90792 PSYCH DIAG EVAL W/MED SRVCS: CPT | Performed by: PSYCHIATRY & NEUROLOGY

## 2018-04-03 PROCEDURE — 1036F TOBACCO NON-USER: CPT | Performed by: FAMILY MEDICINE

## 2018-04-03 PROCEDURE — 1036F TOBACCO NON-USER: CPT | Performed by: PSYCHIATRY & NEUROLOGY

## 2018-04-03 PROCEDURE — G8427 DOCREV CUR MEDS BY ELIG CLIN: HCPCS | Performed by: FAMILY MEDICINE

## 2018-04-03 PROCEDURE — G8417 CALC BMI ABV UP PARAM F/U: HCPCS | Performed by: FAMILY MEDICINE

## 2018-04-03 RX ORDER — CYCLOBENZAPRINE HYDROCHLORIDE 7.5 MG/1
7.5 TABLET, FILM COATED ORAL 3 TIMES DAILY PRN
Qty: 20 TABLET | Refills: 0 | Status: SHIPPED | OUTPATIENT
Start: 2018-04-03 | End: 2018-04-13

## 2018-04-03 RX ORDER — VENLAFAXINE HYDROCHLORIDE 150 MG/1
150 CAPSULE, EXTENDED RELEASE ORAL DAILY
Qty: 90 CAPSULE | Refills: 1
Start: 2018-04-03 | End: 2018-04-18 | Stop reason: SDUPTHER

## 2018-04-03 RX ORDER — LIDOCAINE 40 MG/G
CREAM TOPICAL
Qty: 45 G | Refills: 3 | Status: SHIPPED | OUTPATIENT
Start: 2018-04-03 | End: 2018-09-17

## 2018-04-03 RX ORDER — HYDROXYZINE PAMOATE 25 MG/1
25 CAPSULE ORAL 3 TIMES DAILY PRN
Qty: 90 CAPSULE | Refills: 0 | Status: SHIPPED | OUTPATIENT
Start: 2018-04-03 | End: 2018-09-17

## 2018-04-03 RX ORDER — TERBINAFINE HYDROCHLORIDE 250 MG/1
250 TABLET ORAL DAILY
Qty: 42 TABLET | Refills: 0 | Status: SHIPPED | OUTPATIENT
Start: 2018-04-03 | End: 2018-05-07 | Stop reason: SDUPTHER

## 2018-04-03 RX ORDER — ARIPIPRAZOLE 5 MG/1
5 TABLET ORAL DAILY
Qty: 30 TABLET | Refills: 3
Start: 2018-04-03 | End: 2018-09-17 | Stop reason: ALTCHOICE

## 2018-04-03 RX ORDER — VENLAFAXINE HYDROCHLORIDE 75 MG/1
150 CAPSULE, EXTENDED RELEASE ORAL DAILY
Qty: 30 CAPSULE | Refills: 3
Start: 2018-04-03 | End: 2018-07-24 | Stop reason: SDUPTHER

## 2018-04-03 ASSESSMENT — ENCOUNTER SYMPTOMS
BACK PAIN: 1
SINUS PRESSURE: 0
SHORTNESS OF BREATH: 0
BLOOD IN STOOL: 0
EYES NEGATIVE: 1
CONSTIPATION: 0
BACK PAIN: 1
RESPIRATORY NEGATIVE: 1
COUGH: 0
ROS SKIN COMMENTS: NAIL DISCOLORATION
GASTROINTESTINAL NEGATIVE: 1
COLOR CHANGE: 1
ANAL BLEEDING: 0
WHEEZING: 0
STRIDOR: 0
DIARRHEA: 0
ABDOMINAL PAIN: 0
SINUS PAIN: 0
NAUSEA: 0

## 2018-04-05 ENCOUNTER — HOSPITAL ENCOUNTER (OUTPATIENT)
Age: 38
Discharge: HOME | End: 2018-04-05
Payer: MEDICARE

## 2018-04-05 DIAGNOSIS — F33.2 MAJOR DEPRESSIVE DISORDER, RECURRENT, SEVERE WITHOUT PSYCHOTIC FEATURES (HCC): ICD-10-CM

## 2018-04-05 LAB
ABSOLUTE EOS #: 0 K/UL (ref 0–0.4)
ABSOLUTE IMMATURE GRANULOCYTE: ABNORMAL K/UL (ref 0–0.3)
ABSOLUTE LYMPH #: 1.9 K/UL (ref 1–4.8)
ABSOLUTE MONO #: 0.5 K/UL (ref 0.1–1.3)
ALBUMIN SERPL-MCNC: 4.2 G/DL (ref 3.5–5.2)
ALBUMIN/GLOBULIN RATIO: ABNORMAL (ref 1–2.5)
ALP BLD-CCNC: 50 U/L (ref 35–104)
ALT SERPL-CCNC: 16 U/L (ref 5–33)
AMPHETAMINE SCREEN URINE: NEGATIVE
ANION GAP SERPL CALCULATED.3IONS-SCNC: 14 MMOL/L (ref 9–17)
AST SERPL-CCNC: 12 U/L
BARBITURATE SCREEN URINE: NEGATIVE
BASOPHILS # BLD: 0 % (ref 0–2)
BASOPHILS ABSOLUTE: 0 K/UL (ref 0–0.2)
BENZODIAZEPINE SCREEN, URINE: NEGATIVE
BILIRUB SERPL-MCNC: 0.22 MG/DL (ref 0.3–1.2)
BUN BLDV-MCNC: 11 MG/DL (ref 6–20)
BUN/CREAT BLD: ABNORMAL (ref 9–20)
BUPRENORPHINE URINE: NORMAL
CALCIUM SERPL-MCNC: 8.6 MG/DL (ref 8.6–10.4)
CANNABINOID SCREEN URINE: NEGATIVE
CHLORIDE BLD-SCNC: 100 MMOL/L (ref 98–107)
CHOLESTEROL/HDL RATIO: 4
CHOLESTEROL: 166 MG/DL
CO2: 25 MMOL/L (ref 20–31)
COCAINE METABOLITE, URINE: NEGATIVE
CREAT SERPL-MCNC: 0.61 MG/DL (ref 0.5–0.9)
DIFFERENTIAL TYPE: ABNORMAL
EOSINOPHILS RELATIVE PERCENT: 0 % (ref 0–4)
ESTIMATED AVERAGE GLUCOSE: 120 MG/DL
GFR AFRICAN AMERICAN: >60 ML/MIN
GFR NON-AFRICAN AMERICAN: >60 ML/MIN
GFR SERPL CREATININE-BSD FRML MDRD: ABNORMAL ML/MIN/{1.73_M2}
GFR SERPL CREATININE-BSD FRML MDRD: ABNORMAL ML/MIN/{1.73_M2}
GLUCOSE BLD-MCNC: 127 MG/DL (ref 70–99)
HBA1C MFR BLD: 5.8 % (ref 4–6)
HCT VFR BLD CALC: 40 % (ref 36–46)
HDLC SERPL-MCNC: 41 MG/DL
HEMOGLOBIN: 13.6 G/DL (ref 12–16)
IMMATURE GRANULOCYTES: ABNORMAL %
LDL CHOLESTEROL: 74 MG/DL (ref 0–130)
LYMPHOCYTES # BLD: 35 % (ref 24–44)
MCH RBC QN AUTO: 29.5 PG (ref 26–34)
MCHC RBC AUTO-ENTMCNC: 34.1 G/DL (ref 31–37)
MCV RBC AUTO: 86.4 FL (ref 80–100)
MDMA URINE: NORMAL
METHADONE SCREEN, URINE: NEGATIVE
METHAMPHETAMINE, URINE: NORMAL
MONOCYTES # BLD: 9 % (ref 1–7)
NRBC AUTOMATED: ABNORMAL PER 100 WBC
OPIATES, URINE: NEGATIVE
OXYCODONE SCREEN URINE: NEGATIVE
PDW BLD-RTO: 12.7 % (ref 11.5–14.9)
PHENCYCLIDINE, URINE: NEGATIVE
PLATELET # BLD: 207 K/UL (ref 150–450)
PLATELET ESTIMATE: ABNORMAL
PMV BLD AUTO: 7.6 FL (ref 6–12)
POTASSIUM SERPL-SCNC: 3.9 MMOL/L (ref 3.7–5.3)
PROPOXYPHENE, URINE: NORMAL
RBC # BLD: 4.63 M/UL (ref 4–5.2)
RBC # BLD: ABNORMAL 10*6/UL
SEG NEUTROPHILS: 56 % (ref 36–66)
SEGMENTED NEUTROPHILS ABSOLUTE COUNT: 3.1 K/UL (ref 1.3–9.1)
SODIUM BLD-SCNC: 139 MMOL/L (ref 135–144)
TEST INFORMATION: NORMAL
TOTAL PROTEIN: 6.9 G/DL (ref 6.4–8.3)
TRICYCLIC ANTIDEPRESSANTS, UR: NORMAL
TRIGL SERPL-MCNC: 256 MG/DL
TSH SERPL DL<=0.05 MIU/L-ACNC: 1.8 MIU/L (ref 0.3–5)
VLDLC SERPL CALC-MCNC: ABNORMAL MG/DL (ref 1–30)
WBC # BLD: 5.5 K/UL (ref 3.5–11)
WBC # BLD: ABNORMAL 10*3/UL

## 2018-04-05 PROCEDURE — 83036 HEMOGLOBIN GLYCOSYLATED A1C: CPT

## 2018-04-05 PROCEDURE — 84443 ASSAY THYROID STIM HORMONE: CPT

## 2018-04-05 PROCEDURE — 80053 COMPREHEN METABOLIC PANEL: CPT

## 2018-04-05 PROCEDURE — 80307 DRUG TEST PRSMV CHEM ANLYZR: CPT

## 2018-04-05 PROCEDURE — 85025 COMPLETE CBC W/AUTO DIFF WBC: CPT

## 2018-04-05 PROCEDURE — 80061 LIPID PANEL: CPT

## 2018-04-05 PROCEDURE — 36415 COLL VENOUS BLD VENIPUNCTURE: CPT

## 2018-04-06 DIAGNOSIS — M54.41 CHRONIC LOW BACK PAIN WITH RIGHT-SIDED SCIATICA, UNSPECIFIED BACK PAIN LATERALITY: Primary | ICD-10-CM

## 2018-04-06 DIAGNOSIS — G89.29 CHRONIC LOW BACK PAIN WITH RIGHT-SIDED SCIATICA, UNSPECIFIED BACK PAIN LATERALITY: Primary | ICD-10-CM

## 2018-04-09 RX ORDER — DULOXETIN HYDROCHLORIDE 30 MG/1
30 CAPSULE, DELAYED RELEASE ORAL DAILY
Qty: 30 CAPSULE | Refills: 3 | Status: SHIPPED | OUTPATIENT
Start: 2018-04-09 | End: 2018-07-24 | Stop reason: ALTCHOICE

## 2018-05-07 DIAGNOSIS — B35.1 ONYCHOMYCOSIS: ICD-10-CM

## 2018-05-08 RX ORDER — TERBINAFINE HYDROCHLORIDE 250 MG/1
250 TABLET ORAL DAILY
Qty: 42 TABLET | Refills: 0 | Status: SHIPPED | OUTPATIENT
Start: 2018-05-08 | End: 2018-06-19

## 2018-07-20 DIAGNOSIS — M54.41 CHRONIC LOW BACK PAIN WITH RIGHT-SIDED SCIATICA, UNSPECIFIED BACK PAIN LATERALITY: ICD-10-CM

## 2018-07-20 DIAGNOSIS — G89.29 CHRONIC LOW BACK PAIN WITH RIGHT-SIDED SCIATICA, UNSPECIFIED BACK PAIN LATERALITY: ICD-10-CM

## 2018-07-20 DIAGNOSIS — F33.41 RECURRENT MAJOR DEPRESSIVE DISORDER, IN PARTIAL REMISSION (HCC): ICD-10-CM

## 2018-07-20 RX ORDER — MELOXICAM 15 MG/1
15 TABLET ORAL DAILY PRN
Qty: 30 TABLET | Refills: 3 | Status: SHIPPED | OUTPATIENT
Start: 2018-07-20 | End: 2018-12-16 | Stop reason: SDUPTHER

## 2018-07-20 RX ORDER — VENLAFAXINE HYDROCHLORIDE 150 MG/1
CAPSULE, EXTENDED RELEASE ORAL
Qty: 30 CAPSULE | Refills: 2 | OUTPATIENT
Start: 2018-07-20

## 2018-07-20 NOTE — TELEPHONE ENCOUNTER
From: Moo Chrismanuel  Sent: 7/19/2018 6:49 PM EDT  Subject: Medication Renewal Request    Brinda English would like a refill of the following medications:     meloxicam (MOBIC) 15 MG tablet Danish Chan MD]     venlafaxine (EFFEXOR XR) 150 MG extended release capsule Danish Chan MD]    Preferred pharmacy: [address]

## 2018-07-20 NOTE — TELEPHONE ENCOUNTER
Please clarify with patient if she is on both Cymbalta and Effexor? ? Should be on only one. What dose?

## 2018-07-24 RX ORDER — VENLAFAXINE HYDROCHLORIDE 75 MG/1
150 CAPSULE, EXTENDED RELEASE ORAL DAILY
Qty: 30 CAPSULE | Refills: 3 | Status: SHIPPED | OUTPATIENT
Start: 2018-07-24 | End: 2018-10-29 | Stop reason: SDUPTHER

## 2018-07-24 RX ORDER — VENLAFAXINE HYDROCHLORIDE 150 MG/1
CAPSULE, EXTENDED RELEASE ORAL
Qty: 30 CAPSULE | Refills: 3 | Status: SHIPPED | OUTPATIENT
Start: 2018-07-24 | End: 2018-10-29 | Stop reason: SDUPTHER

## 2018-07-25 ENCOUNTER — TELEPHONE (OUTPATIENT)
Dept: FAMILY MEDICINE CLINIC | Age: 38
End: 2018-07-25

## 2018-07-25 NOTE — TELEPHONE ENCOUNTER
Pharmacy is calling and would like clarification on the two prescriptions that were sent to them on 7/24/2018. They are trying to figure out the Instructions that was given for the Effexor XR 75 mg and for the Effecor Xr 150mg. Please advise. Susan Wylie

## 2018-09-17 ENCOUNTER — OFFICE VISIT (OUTPATIENT)
Dept: FAMILY MEDICINE CLINIC | Age: 38
End: 2018-09-17
Payer: MEDICARE

## 2018-09-17 VITALS
HEIGHT: 66 IN | HEART RATE: 92 BPM | OXYGEN SATURATION: 99 % | BODY MASS INDEX: 33.62 KG/M2 | SYSTOLIC BLOOD PRESSURE: 136 MMHG | WEIGHT: 209.2 LBS | DIASTOLIC BLOOD PRESSURE: 90 MMHG

## 2018-09-17 DIAGNOSIS — B35.1 ONYCHOMYCOSIS: ICD-10-CM

## 2018-09-17 DIAGNOSIS — I10 ESSENTIAL HYPERTENSION: ICD-10-CM

## 2018-09-17 DIAGNOSIS — R73.03 PREDIABETES: ICD-10-CM

## 2018-09-17 DIAGNOSIS — R73.9 HYPERGLYCEMIA: ICD-10-CM

## 2018-09-17 DIAGNOSIS — F33.41 RECURRENT MAJOR DEPRESSIVE DISORDER, IN PARTIAL REMISSION (HCC): ICD-10-CM

## 2018-09-17 DIAGNOSIS — M54.41 CHRONIC LOW BACK PAIN WITH RIGHT-SIDED SCIATICA, UNSPECIFIED BACK PAIN LATERALITY: Primary | ICD-10-CM

## 2018-09-17 DIAGNOSIS — G89.29 CHRONIC LOW BACK PAIN WITH RIGHT-SIDED SCIATICA, UNSPECIFIED BACK PAIN LATERALITY: Primary | ICD-10-CM

## 2018-09-17 DIAGNOSIS — M53.3 SACROILIAC JOINT PAIN: ICD-10-CM

## 2018-09-17 LAB — HBA1C MFR BLD: 5.8 %

## 2018-09-17 PROCEDURE — 1036F TOBACCO NON-USER: CPT | Performed by: FAMILY MEDICINE

## 2018-09-17 PROCEDURE — 83036 HEMOGLOBIN GLYCOSYLATED A1C: CPT | Performed by: FAMILY MEDICINE

## 2018-09-17 PROCEDURE — G8427 DOCREV CUR MEDS BY ELIG CLIN: HCPCS | Performed by: FAMILY MEDICINE

## 2018-09-17 PROCEDURE — 99214 OFFICE O/P EST MOD 30 MIN: CPT | Performed by: FAMILY MEDICINE

## 2018-09-17 PROCEDURE — G8417 CALC BMI ABV UP PARAM F/U: HCPCS | Performed by: FAMILY MEDICINE

## 2018-09-17 RX ORDER — DEXTROAMPHETAMINE SACCHARATE, AMPHETAMINE ASPARTATE, DEXTROAMPHETAMINE SULFATE AND AMPHETAMINE SULFATE 1.875; 1.875; 1.875; 1.875 MG/1; MG/1; MG/1; MG/1
TABLET ORAL
Refills: 0 | COMMUNITY
Start: 2018-09-06

## 2018-09-17 RX ORDER — TIZANIDINE 4 MG/1
4 TABLET ORAL DAILY PRN
Qty: 30 TABLET | Refills: 0 | Status: SHIPPED | OUTPATIENT
Start: 2018-09-17 | End: 2020-02-06 | Stop reason: SDUPTHER

## 2018-09-17 RX ORDER — BUPROPION HYDROCHLORIDE 150 MG/1
TABLET ORAL
Refills: 1 | COMMUNITY
Start: 2018-08-25 | End: 2019-01-29 | Stop reason: ALTCHOICE

## 2018-09-17 RX ORDER — TRAZODONE HYDROCHLORIDE 50 MG/1
TABLET ORAL
Refills: 1 | COMMUNITY
Start: 2018-09-06 | End: 2020-02-06 | Stop reason: DRUGHIGH

## 2018-09-17 RX ORDER — LISINOPRIL 5 MG/1
5 TABLET ORAL DAILY
Qty: 30 TABLET | Refills: 3 | Status: SHIPPED | OUTPATIENT
Start: 2018-09-17 | End: 2019-03-18 | Stop reason: SDUPTHER

## 2018-09-17 ASSESSMENT — ENCOUNTER SYMPTOMS
COUGH: 0
BACK PAIN: 1
FACIAL SWELLING: 0
DIARRHEA: 0
WHEEZING: 0
VOMITING: 0
SHORTNESS OF BREATH: 0
BLOOD IN STOOL: 0
PHOTOPHOBIA: 0
CHOKING: 0
ANAL BLEEDING: 0
ABDOMINAL PAIN: 0
CONSTIPATION: 0

## 2018-09-17 NOTE — PROGRESS NOTES
Chief Complaint   Patient presents with    Back Pain    Hypertension         Marty Kimble  here today for follow up on chronic medical problems, go over labs and/or diagnostic studies, and medication refills. Back Pain and Hypertension      HPI:Patient is here for follow-up on back pain and depression. Back pain still same, has not improved. She did not started physical therapy. Muscle is under lidocaine was not covered by insurance. Depression follows with [facility] stable currently. Had onychomycosis has resolved with treatment. patient prediabetic, A1c stable 5.8 today . Hypertension, newly diagnosed recently her blood pressure has been high, ranges from 130 to 140    At home. BP (!) 136/90 Comment: manual  Pulse 92   Ht 5' 6\" (1.676 m)   Wt 209 lb 3.2 oz (94.9 kg)   SpO2 99% Comment: rest @ RA  BMI 33.77 kg/m²   Body mass index is 33.77 kg/m². Wt Readings from Last 3 Encounters:   09/17/18 209 lb 3.2 oz (94.9 kg)   04/03/18 216 lb (98 kg)   04/03/18 215 lb 12.8 oz (97.9 kg)        []Negative depression screening. PHQ Scores 1/31/2018 11/1/2017 5/24/2017 4/20/2017 11/11/2015 6/22/2015 3/20/2015   PHQ2 Score 5 3 4 6 0 3 6   PHQ9 Score 22 3 16 22 0 13 18      []1-4 = Minimal depression   []5-9 = Mild depression   [x]10-14 = Moderate depression   []15-19 = Moderately severe depression   []20-27 = Severe depression    Discussed testing with the patient and all questions fully answered.     Hospital Outpatient Visit on 04/05/2018   Component Date Value Ref Range Status    Glucose 04/05/2018 127* 70 - 99 mg/dL Final    BUN 04/05/2018 11  6 - 20 mg/dL Final    CREATININE 04/05/2018 0.61  0.50 - 0.90 mg/dL Final    Bun/Cre Ratio 04/05/2018 NOT REPORTED  9 - 20 Final    Calcium 04/05/2018 8.6  8.6 - 10.4 mg/dL Final    Sodium 04/05/2018 139  135 - 144 mmol/L Final    Potassium 04/05/2018 3.9  3.7 - 5.3 mmol/L Final    Chloride 04/05/2018 100  98 - 107 mmol/L Final    CO2 REPORTED   Final    RBC Morphology 04/05/2018 NOT REPORTED   Final    Platelet Estimate 34/61/0982 NOT REPORTED   Final         Most recent labs reviewed:     Lab Results   Component Value Date    WBC 5.5 04/05/2018    HGB 13.6 04/05/2018    HCT 40.0 04/05/2018    MCV 86.4 04/05/2018     04/05/2018       Lab Results   Component Value Date     04/05/2018    K 3.9 04/05/2018     04/05/2018    CO2 25 04/05/2018    BUN 11 04/05/2018    CREATININE 0.61 04/05/2018    GLUCOSE 127 04/05/2018    CALCIUM 8.6 04/05/2018        Lab Results   Component Value Date    ALT 16 04/05/2018    AST 12 04/05/2018    ALKPHOS 50 04/05/2018    BILITOT 0.22 (L) 04/05/2018       Lab Results   Component Value Date    TSH 1.80 04/05/2018       Lab Results   Component Value Date    CHOL 166 04/05/2018    CHOL 158 09/02/2015     Lab Results   Component Value Date    TRIG 256 (H) 04/05/2018    TRIG 83 09/02/2015     Lab Results   Component Value Date    HDL 41 04/05/2018    HDL 41 09/02/2015     Lab Results   Component Value Date    LDLCHOLESTEROL 74 04/05/2018    LDLCHOLESTEROL 100 09/02/2015     Lab Results   Component Value Date    VLDL NOT REPORTED 04/05/2018    VLDL NOT REPORTED 09/02/2015     Lab Results   Component Value Date    CHOLHDLRATIO 4.0 04/05/2018    CHOLHDLRATIO 3.9 09/02/2015       Lab Results   Component Value Date    LABA1C 5.8 09/17/2018       No results found for: YYQHVMOB05    No results found for: FOLATE    No results found for: IRON, TIBC, FERRITIN    No results found for: VITD25          Current Outpatient Prescriptions   Medication Sig Dispense Refill    amphetamine-dextroamphetamine (ADDERALL) 7.5 MG tablet TAKE 1 TABLET BY MOUTH TWO TIMES A DAY  0    buPROPion (WELLBUTRIN XL) 150 MG extended release tablet TAKE 1 TABLET BY MOUTH IN THE MORNING FOR 1 WEEK THEN INCREASE TO 2 TABS IN THE MORNING FOR 1 WEEK THEN INCREASE TO 3 TABS IN THE MORNING  1    Cholecalciferol (VITAMIN D3) 5000 units CAPS fatigue and unexpected weight change. HENT: Negative for congestion, facial swelling, hearing loss and postnasal drip. Eyes: Negative for photophobia and visual disturbance. Respiratory: Negative for cough, choking, shortness of breath and wheezing. Cardiovascular: Negative for chest pain, palpitations and leg swelling. Gastrointestinal: Negative for abdominal pain, anal bleeding, blood in stool, constipation, diarrhea and vomiting. Genitourinary: Negative for dysuria, flank pain, frequency, hematuria, pelvic pain and urgency. Musculoskeletal: Positive for arthralgias and back pain. Negative for joint swelling, myalgias and neck pain. Neurological: Positive for numbness. Negative for dizziness, weakness and headaches. Psychiatric/Behavioral: Positive for decreased concentration. Negative for agitation, behavioral problems, dysphoric mood, hallucinations, self-injury and sleep disturbance. The patient is not nervous/anxious and is not hyperactive. Physical Exam    PHYSICAL EXAM:   VITALS:   Vitals:    09/17/18 0924   BP: (!) 136/90   Pulse:    SpO2:      GENERAL:  Patient is a well-developed, well-nourished female  in no acute distress, alert and oriented x3, appropriate and pleasant conversation. HEAD: Normocephalic, atraumatic. EYES: Pupils equal, round and reactive to light and accommodation, extraocular   movements intact. ENT: Moist mucous membranes. No erythema is noted. NECK: Supple. No masses. No lymphadenopathy. CARDIOVASCULAR: Regular rate and rhythm. PULMONARY: Lungs are clear to auscultation bilaterally. ABDOMEN: Soft, nontender, nondistended. Positive bowel sounds. MUSCULOSKELETAL: No lumbar spine tenderness, SLR positive on right side. NEUROLOGIC: Cranial nerves II through XII grossly intact. No focal deficits are noted. ASSESSMENT AND PLAN      1.  Chronic low back pain with right-sided sciatica, unspecified back pain laterality  -Discussed with patient we will repeat x-rays, start physical therapy. Refilled muscle relaxant continue NSAIDs  - XR LUMBAR SPINE (MIN 4 VIEWS); Future  - tiZANidine (ZANAFLEX) 4 MG tablet; Take 1 tablet by mouth daily as needed (low back pain)  Dispense: 30 tablet; Refill: 0    2. Sacroiliac joint pain  -Repeat x-rays and continue physical therapy    3. Recurrent major depressive disorder, in partial remission ([facility] [address]  -Stable on current treatment    4. Onychomycosis  -Resolved on with treatment    5. Essential hypertension  -Started on low-dose lisinopril, follow-up in one month  - lisinopril (PRINIVIL;ZESTRIL) 5 MG tablet; Take 1 tablet by mouth daily  Dispense: 30 tablet; Refill: 3    6. Prediabetes  -A1c is 5.8, discussed about the diet and exercise. 7. Hyperglycemia    - POCT glycosylated hemoglobin (Hb A1C)      Orders Placed This Encounter   Procedures    XR LUMBAR SPINE (MIN 4 VIEWS)     Standing Status:   Future     Standing Expiration Date:   9/17/2019     Order Specific Question:   Reason for exam:     Answer:   compression fracture    POCT glycosylated hemoglobin (Hb A1C)         Medications Discontinued During This Encounter   Medication Reason    ARIPiprazole (ABILIFY) 5 MG tablet Therapy completed    doxycycline hyclate (VIBRA-TABS) 100 MG tablet Therapy completed    hydrOXYzine (VISTARIL) 25 MG capsule Patient Choice    lidocaine (LMX) 4 % cream Cost of medication       Sanjuanita Husbands received counseling on the following healthy behaviors: nutrition, exercise and medication adherence  Reviewed prior labs and health maintenance  Continue current medications, diet and exercise. Discussed use, benefit, and side effects of prescribed medications. Barriers to medication compliance addressed. Patient given educational materials - see patient instructions  Was a self-tracking handout given in paper form or via [employer]?  Yes    Requested Prescriptions     Signed Prescriptions Disp Refills    tiZANidine (ZANAFLEX) 4 MG tablet 30 tablet 0     Sig: Take 1 tablet by mouth daily as needed (low back pain)    lisinopril (PRINIVIL;ZESTRIL) 5 MG tablet 30 tablet 3     Sig: Take 1 tablet by mouth daily       All patient questions answered. Patient voiced understanding. Quality Measures    Body mass index is 33.77 kg/m². Elevated. Weight control planned discussed Healthy diet and regular exercise. BP: (!) 136/90 (manual) Blood pressure is high. Treatment plan consists of DASH Eating Plan, Dietary Sodium Restriction, Increased Physical Activity, Patient In-home Blood Pressure Monitoring and Antihypertensive Medication Started. Lab Results   Component Value Date    LDLCHOLESTEROL 74 04/05/2018    (goal LDL reduction with dx if diabetes is 50% LDL reduction)      PHQ Scores 1/31/2018 11/1/2017 5/24/2017 4/20/2017 11/11/2015 6/22/2015 3/20/2015   PHQ2 Score 5 3 4 6 0 3 6   PHQ9 Score 22 3 16 22 0 13 18     Interpretation of Total Score Depression Severity: 1-4 = Minimal depression, 5-9 = Mild depression, 10-14 = Moderate depression, 15-19 = Moderately severe depression, 20-27 = Severe depression    The patient's past medical, surgical, social, and family history as well as her   current medications and allergies were reviewed as documented in today's encounter. Medications, labs, diagnostic studies, consultations and follow-up as documented in this encounter. Return for for htn, BP log , low back pain . Patient was seen with total face to face time of 25 minutes. More than 50% of this visit was counseling and education. Future Appointments  Date Time Provider Peggy Tineo   10/29/2018 9:00 AM Jose Luis Mauricio MD [facility]     This note was completed by using the assistance of a speech-recognition program. However, inadvertent computerized transcription errors may be present.  Although every effort was made to ensure accuracy, no guarantees can be provided that every mistake has been identified and corrected by editing.   Electronically signed by Jose Luis Mauricio MD on 9/17/2018  9:45 AM

## 2018-09-17 NOTE — PROGRESS NOTES
Visit Information    Have you changed or started any medications since your last visit including any over-the-counter medicines, vitamins, or herbal medicines? no   Are you having any side effects from any of your medications? -  no  Have you stopped taking any of your medications? Is so, why? -  no    Have you seen any other physician or provider since your last visit? Yes - Records Obtained  Have you had any other diagnostic tests since your last visit? No  Have you been seen in the emergency room and/or had an admission to a hospital since we last saw you? No  Have you had your routine dental cleaning in the past 6 months? no    Have you activated your [email or web address] account? If not, what are your barriers?  Yes     Patient Care Team:  Percy Meadows MD as PCP - General (Family Medicine)    Medical History Review  Past Medical, Family, and Social History reviewed and does contribute to the patient presenting condition    Health Maintenance   Topic Date Due    Flu vaccine (1) 09/01/2018    A1C test (Diabetic or Prediabetic)  04/05/2019    Cervical cancer screen  11/11/2020    DTaP/Tdap/Td vaccine (2 - Td) 04/03/2028    HIV screen  Completed

## 2018-10-29 ENCOUNTER — OFFICE VISIT (OUTPATIENT)
Dept: FAMILY MEDICINE CLINIC | Age: 38
End: 2018-10-29
Payer: MEDICARE

## 2018-10-29 ENCOUNTER — HOSPITAL ENCOUNTER (OUTPATIENT)
Age: 38
Setting detail: SPECIMEN
Discharge: HOME | End: 2018-10-29
Payer: MEDICARE

## 2018-10-29 VITALS
DIASTOLIC BLOOD PRESSURE: 78 MMHG | SYSTOLIC BLOOD PRESSURE: 126 MMHG | HEART RATE: 89 BPM | WEIGHT: 210 LBS | OXYGEN SATURATION: 98 % | BODY MASS INDEX: 33.75 KG/M2 | HEIGHT: 66 IN

## 2018-10-29 DIAGNOSIS — G89.29 CHRONIC MIDLINE LOW BACK PAIN WITH RIGHT-SIDED SCIATICA: Primary | ICD-10-CM

## 2018-10-29 DIAGNOSIS — N89.8 VAGINAL DISCHARGE: ICD-10-CM

## 2018-10-29 DIAGNOSIS — R73.03 PREDIABETES: ICD-10-CM

## 2018-10-29 DIAGNOSIS — M54.41 CHRONIC MIDLINE LOW BACK PAIN WITH RIGHT-SIDED SCIATICA: Primary | ICD-10-CM

## 2018-10-29 DIAGNOSIS — Z13.31 POSITIVE DEPRESSION SCREENING: ICD-10-CM

## 2018-10-29 DIAGNOSIS — M53.3 SACROILIAC JOINT PAIN: ICD-10-CM

## 2018-10-29 DIAGNOSIS — F33.41 RECURRENT MAJOR DEPRESSIVE DISORDER, IN PARTIAL REMISSION (HCC): ICD-10-CM

## 2018-10-29 LAB
DIRECT EXAM: ABNORMAL
Lab: ABNORMAL
SPECIMEN DESCRIPTION: ABNORMAL
STATUS: ABNORMAL

## 2018-10-29 PROCEDURE — G8431 POS CLIN DEPRES SCRN F/U DOC: HCPCS | Performed by: FAMILY MEDICINE

## 2018-10-29 PROCEDURE — G8427 DOCREV CUR MEDS BY ELIG CLIN: HCPCS | Performed by: FAMILY MEDICINE

## 2018-10-29 PROCEDURE — 1036F TOBACCO NON-USER: CPT | Performed by: FAMILY MEDICINE

## 2018-10-29 PROCEDURE — 99214 OFFICE O/P EST MOD 30 MIN: CPT | Performed by: FAMILY MEDICINE

## 2018-10-29 PROCEDURE — G8417 CALC BMI ABV UP PARAM F/U: HCPCS | Performed by: FAMILY MEDICINE

## 2018-10-29 PROCEDURE — 96160 PT-FOCUSED HLTH RISK ASSMT: CPT | Performed by: FAMILY MEDICINE

## 2018-10-29 PROCEDURE — G8484 FLU IMMUNIZE NO ADMIN: HCPCS | Performed by: FAMILY MEDICINE

## 2018-10-29 RX ORDER — VENLAFAXINE HYDROCHLORIDE 150 MG/1
CAPSULE, EXTENDED RELEASE ORAL
Qty: 30 CAPSULE | Refills: 3 | Status: SHIPPED | OUTPATIENT
Start: 2018-10-29 | End: 2019-03-18 | Stop reason: SDUPTHER

## 2018-10-29 RX ORDER — VENLAFAXINE HYDROCHLORIDE 75 MG/1
150 CAPSULE, EXTENDED RELEASE ORAL DAILY
Qty: 30 CAPSULE | Refills: 3 | Status: SHIPPED | OUTPATIENT
Start: 2018-10-29 | End: 2018-10-29 | Stop reason: DRUGHIGH

## 2018-10-29 RX ORDER — VENLAFAXINE HYDROCHLORIDE 75 MG/1
75 CAPSULE, EXTENDED RELEASE ORAL DAILY
Qty: 30 CAPSULE | Refills: 3 | Status: SHIPPED | OUTPATIENT
Start: 2018-10-29 | End: 2019-08-06 | Stop reason: SDUPTHER

## 2018-10-29 ASSESSMENT — ENCOUNTER SYMPTOMS
COUGH: 0
TROUBLE SWALLOWING: 0
SHORTNESS OF BREATH: 0
RHINORRHEA: 0
WHEEZING: 0
PHOTOPHOBIA: 0
BACK PAIN: 1
ABDOMINAL PAIN: 0
EYE REDNESS: 0
SINUS PRESSURE: 0
BLOOD IN STOOL: 0
CONSTIPATION: 0

## 2018-10-29 ASSESSMENT — PATIENT HEALTH QUESTIONNAIRE - PHQ9
SUM OF ALL RESPONSES TO PHQ QUESTIONS 1-9: 13
8. MOVING OR SPEAKING SO SLOWLY THAT OTHER PEOPLE COULD HAVE NOTICED. OR THE OPPOSITE, BEING SO FIGETY OR RESTLESS THAT YOU HAVE BEEN MOVING AROUND A LOT MORE THAN USUAL: 0
3. TROUBLE FALLING OR STAYING ASLEEP: 0
2. FEELING DOWN, DEPRESSED OR HOPELESS: 3
7. TROUBLE CONCENTRATING ON THINGS, SUCH AS READING THE NEWSPAPER OR WATCHING TELEVISION: 1
10. IF YOU CHECKED OFF ANY PROBLEMS, HOW DIFFICULT HAVE THESE PROBLEMS MADE IT FOR YOU TO DO YOUR WORK, TAKE CARE OF THINGS AT HOME, OR GET ALONG WITH OTHER PEOPLE: 2
6. FEELING BAD ABOUT YOURSELF - OR THAT YOU ARE A FAILURE OR HAVE LET YOURSELF OR YOUR FAMILY DOWN: 2
4. FEELING TIRED OR HAVING LITTLE ENERGY: 2
5. POOR APPETITE OR OVEREATING: 2
9. THOUGHTS THAT YOU WOULD BE BETTER OFF DEAD, OR OF HURTING YOURSELF: 0
SUM OF ALL RESPONSES TO PHQ9 QUESTIONS 1 & 2: 6
SUM OF ALL RESPONSES TO PHQ QUESTIONS 1-9: 13
1. LITTLE INTEREST OR PLEASURE IN DOING THINGS: 3

## 2018-10-29 NOTE — PROGRESS NOTES
meloxicam (MOBIC) 15 MG tablet Take 1 tablet by mouth daily as needed for Pain (back pain) 30 tablet 3     No current facility-administered medications for this visit. Social History     Social History    Marital status: Legally      Spouse name: N/A    Number of children: N/A    Years of education: N/A     Occupational History    Not on file. Social History Main Topics    Smoking status: Never Smoker    Smokeless tobacco: Never Used    Alcohol use No    Drug use: No    Sexual activity: Yes     Partners: Male     Other Topics Concern    Not on file     Social History Narrative    No narrative on file     Counseling given: Yes        Family History   Problem Relation Age of Onset    Arthritis Mother     Depression Mother     High Blood Pressure Mother     Colon Cancer Mother     High Blood Pressure Father     Cervical Cancer Maternal Grandmother     Cancer Paternal Grandmother         melanoma bladder kidney    Cancer Paternal Grandfather         melanoma             -rest of complaints with corresponding details per ROS    The patient's past medical, surgical, social, and family history as well as her current medications and allergies were reviewed as documented intoday's encounter. Review of Systems   Constitutional: Negative for activity change, appetite change, fatigue and unexpected weight change. HENT: Negative for congestion, postnasal drip, rhinorrhea, sinus pressure and trouble swallowing. Eyes: Negative for photophobia, redness and visual disturbance. Respiratory: Negative for cough, shortness of breath and wheezing. Cardiovascular: Negative for chest pain, palpitations and leg swelling. Gastrointestinal: Negative for abdominal pain, blood in stool and constipation. Endocrine: Negative for polydipsia, polyphagia and polyuria. Genitourinary: Positive for vaginal discharge and vaginal pain.  Negative for difficulty urinating, dysuria, flank understanding. Quality Measures    Body mass index is 33.89 kg/m². Elevated. Weight control planned discussed Healthy diet and regular exercise. BP: 126/78 Blood pressure is normal. Treatment plan consists of No treatment change needed. Lab Results   Component Value Date    LDLCHOLESTEROL 74 04/05/2018    (goal LDL reduction with dx if diabetes is 50% LDL reduction)      PHQ Scores 10/29/2018 1/31/2018 11/1/2017 5/24/2017 4/20/2017 11/11/2015 6/22/2015   PHQ2 Score 6 5 3 4 6 0 3   PHQ9 Score 13 22 3 16 22 0 13     Interpretation of Total Score Depression Severity: 1-4 = Minimal depression, 5-9 = Mild depression, 10-14 = Moderate depression, 15-19 = Moderately severe depression, 20-27 = Severe depression    The patient'spast medical, surgical, social, and family history as well as her   current medications and allergies were reviewed as documented in today's encounter. Medications, labs, diagnostic studies, consultations andfollow-up as documented in this encounter. No Follow-up on file. Patient wasseen with total face to face time of 25 minutes. More than 50% of this visit was counseling and education. Future Appointments  Date Time Provider Peggy Tineo   1/29/2019 8:00 AM [facility]     This note was completed by using the assistance of a speech-recognition program. However, inadvertent computerized transcription errors may be present. Althoughevery effort was made to ensure accuracy, no guarantees can be provided that every mistake has been identified and corrected by editing. Electronically signed by Ileana Browning MD on 10/29/2018  10:05 AM              On the basis of positive PHQ-9 screening (PHQ-9 Total Score: 13), the following plan was implemented: is on treatment , [facility] . Patient will follow-up in 3 month(s) with PCP.

## 2018-10-30 DIAGNOSIS — B96.89 BACTERIAL VAGINOSIS: Primary | ICD-10-CM

## 2018-10-30 DIAGNOSIS — B37.31 CANDIDA VAGINITIS: ICD-10-CM

## 2018-10-30 DIAGNOSIS — N76.0 BACTERIAL VAGINOSIS: Primary | ICD-10-CM

## 2018-10-30 RX ORDER — FLUCONAZOLE 150 MG/1
150 TABLET ORAL ONCE
Qty: 1 TABLET | Refills: 0 | Status: SHIPPED | OUTPATIENT
Start: 2018-10-30 | End: 2018-10-30

## 2018-10-30 RX ORDER — METRONIDAZOLE 500 MG/1
500 TABLET ORAL 2 TIMES DAILY
Qty: 14 TABLET | Refills: 0 | Status: SHIPPED | OUTPATIENT
Start: 2018-10-30 | End: 2018-11-06

## 2018-12-16 DIAGNOSIS — M54.41 CHRONIC LOW BACK PAIN WITH RIGHT-SIDED SCIATICA, UNSPECIFIED BACK PAIN LATERALITY: ICD-10-CM

## 2018-12-16 DIAGNOSIS — G89.29 CHRONIC LOW BACK PAIN WITH RIGHT-SIDED SCIATICA, UNSPECIFIED BACK PAIN LATERALITY: ICD-10-CM

## 2018-12-17 RX ORDER — MELOXICAM 15 MG/1
TABLET ORAL
Qty: 30 TABLET | Refills: 2 | Status: SHIPPED | OUTPATIENT
Start: 2018-12-17 | End: 2019-03-18 | Stop reason: SDUPTHER

## 2019-01-29 ENCOUNTER — OFFICE VISIT (OUTPATIENT)
Dept: FAMILY MEDICINE CLINIC | Age: 39
End: 2019-01-29
Payer: COMMERCIAL

## 2019-01-29 VITALS
WEIGHT: 203.4 LBS | HEART RATE: 93 BPM | SYSTOLIC BLOOD PRESSURE: 127 MMHG | OXYGEN SATURATION: 98 % | DIASTOLIC BLOOD PRESSURE: 82 MMHG | HEIGHT: 66 IN | BODY MASS INDEX: 32.69 KG/M2

## 2019-01-29 DIAGNOSIS — M54.41 CHRONIC MIDLINE LOW BACK PAIN WITH RIGHT-SIDED SCIATICA: Primary | ICD-10-CM

## 2019-01-29 DIAGNOSIS — G89.29 CHRONIC MIDLINE LOW BACK PAIN WITH RIGHT-SIDED SCIATICA: Primary | ICD-10-CM

## 2019-01-29 DIAGNOSIS — F33.2 MAJOR DEPRESSIVE DISORDER, RECURRENT, SEVERE WITHOUT PSYCHOTIC FEATURES (HCC): ICD-10-CM

## 2019-01-29 DIAGNOSIS — R73.03 PREDIABETES: ICD-10-CM

## 2019-01-29 PROBLEM — N76.0 BACTERIAL VAGINOSIS: Status: RESOLVED | Noted: 2018-10-30 | Resolved: 2019-01-29

## 2019-01-29 PROBLEM — B96.89 BACTERIAL VAGINOSIS: Status: RESOLVED | Noted: 2018-10-30 | Resolved: 2019-01-29

## 2019-01-29 PROCEDURE — G8482 FLU IMMUNIZE ORDER/ADMIN: HCPCS | Performed by: FAMILY MEDICINE

## 2019-01-29 PROCEDURE — G8427 DOCREV CUR MEDS BY ELIG CLIN: HCPCS | Performed by: FAMILY MEDICINE

## 2019-01-29 PROCEDURE — G8417 CALC BMI ABV UP PARAM F/U: HCPCS | Performed by: FAMILY MEDICINE

## 2019-01-29 PROCEDURE — 99213 OFFICE O/P EST LOW 20 MIN: CPT | Performed by: FAMILY MEDICINE

## 2019-01-29 PROCEDURE — 1036F TOBACCO NON-USER: CPT | Performed by: FAMILY MEDICINE

## 2019-01-29 ASSESSMENT — ENCOUNTER SYMPTOMS
BACK PAIN: 1
SHORTNESS OF BREATH: 0
COUGH: 0
WHEEZING: 0

## 2019-03-18 DIAGNOSIS — M54.41 CHRONIC LOW BACK PAIN WITH RIGHT-SIDED SCIATICA, UNSPECIFIED BACK PAIN LATERALITY: ICD-10-CM

## 2019-03-18 DIAGNOSIS — I10 ESSENTIAL HYPERTENSION: ICD-10-CM

## 2019-03-18 DIAGNOSIS — F33.41 RECURRENT MAJOR DEPRESSIVE DISORDER, IN PARTIAL REMISSION (HCC): ICD-10-CM

## 2019-03-18 DIAGNOSIS — G89.29 CHRONIC LOW BACK PAIN WITH RIGHT-SIDED SCIATICA, UNSPECIFIED BACK PAIN LATERALITY: ICD-10-CM

## 2019-03-18 DIAGNOSIS — Z13.31 POSITIVE DEPRESSION SCREENING: ICD-10-CM

## 2019-03-18 RX ORDER — LISINOPRIL 5 MG/1
TABLET ORAL
Qty: 30 TABLET | Refills: 2 | Status: SHIPPED | OUTPATIENT
Start: 2019-03-18 | End: 2019-06-20 | Stop reason: SDUPTHER

## 2019-03-18 RX ORDER — MELOXICAM 15 MG/1
TABLET ORAL
Qty: 30 TABLET | Refills: 2 | Status: SHIPPED | OUTPATIENT
Start: 2019-03-18 | End: 2019-10-08 | Stop reason: SDUPTHER

## 2019-03-18 RX ORDER — VENLAFAXINE HYDROCHLORIDE 150 MG/1
CAPSULE, EXTENDED RELEASE ORAL
Qty: 30 CAPSULE | Refills: 2 | Status: SHIPPED | OUTPATIENT
Start: 2019-03-18 | End: 2019-08-06 | Stop reason: SDUPTHER

## 2019-06-20 DIAGNOSIS — I10 ESSENTIAL HYPERTENSION: ICD-10-CM

## 2019-06-20 RX ORDER — LISINOPRIL 5 MG/1
TABLET ORAL
Qty: 30 TABLET | Refills: 1 | Status: SHIPPED | OUTPATIENT
Start: 2019-06-20 | End: 2019-11-05 | Stop reason: SDUPTHER

## 2019-06-20 NOTE — TELEPHONE ENCOUNTER
Please Approve or Refuse.   Send to Pharmacy per Pt's Request:     Next Visit Date:  8/6/2019   Last Visit Date: 1/29/2019    Hemoglobin A1C (%)   Date Value   09/17/2018 5.8   04/05/2018 5.8   09/02/2015 5.1             ( goal A1C is < 7)   BP Readings from Last 3 Encounters:   01/29/19 127/82   10/29/18 126/78   09/17/18 (!) 136/90          (goal 120/80)  BUN   Date Value Ref Range Status   04/05/2018 11 6 - 20 mg/dL Final     CREATININE   Date Value Ref Range Status   04/05/2018 0.61 0.50 - 0.90 mg/dL Final     Potassium   Date Value Ref Range Status   04/05/2018 3.9 3.7 - 5.3 mmol/L Final

## 2019-06-22 DIAGNOSIS — G89.29 CHRONIC LOW BACK PAIN WITH RIGHT-SIDED SCIATICA, UNSPECIFIED BACK PAIN LATERALITY: ICD-10-CM

## 2019-06-22 DIAGNOSIS — M54.41 CHRONIC LOW BACK PAIN WITH RIGHT-SIDED SCIATICA, UNSPECIFIED BACK PAIN LATERALITY: ICD-10-CM

## 2019-06-22 DIAGNOSIS — Z13.31 POSITIVE DEPRESSION SCREENING: ICD-10-CM

## 2019-06-22 DIAGNOSIS — F33.41 RECURRENT MAJOR DEPRESSIVE DISORDER, IN PARTIAL REMISSION (HCC): ICD-10-CM

## 2019-06-24 RX ORDER — VENLAFAXINE HYDROCHLORIDE 150 MG/1
CAPSULE, EXTENDED RELEASE ORAL
Qty: 30 CAPSULE | Refills: 2 | Status: SHIPPED | OUTPATIENT
Start: 2019-06-24 | End: 2019-10-02 | Stop reason: SDUPTHER

## 2019-06-24 RX ORDER — MELOXICAM 15 MG/1
TABLET ORAL
Qty: 30 TABLET | Refills: 1 | Status: SHIPPED | OUTPATIENT
Start: 2019-06-24 | End: 2019-08-06 | Stop reason: ALTCHOICE

## 2019-08-06 ENCOUNTER — OFFICE VISIT (OUTPATIENT)
Dept: FAMILY MEDICINE CLINIC | Age: 39
End: 2019-08-06
Payer: COMMERCIAL

## 2019-08-06 VITALS
WEIGHT: 206.4 LBS | DIASTOLIC BLOOD PRESSURE: 94 MMHG | HEART RATE: 82 BPM | SYSTOLIC BLOOD PRESSURE: 138 MMHG | HEIGHT: 66 IN | BODY MASS INDEX: 33.17 KG/M2 | OXYGEN SATURATION: 98 %

## 2019-08-06 DIAGNOSIS — E66.09 CLASS 1 OBESITY DUE TO EXCESS CALORIES WITHOUT SERIOUS COMORBIDITY WITH BODY MASS INDEX (BMI) OF 33.0 TO 33.9 IN ADULT: ICD-10-CM

## 2019-08-06 DIAGNOSIS — F32.A ANXIETY AND DEPRESSION: ICD-10-CM

## 2019-08-06 DIAGNOSIS — L91.8 SKIN TAG: ICD-10-CM

## 2019-08-06 DIAGNOSIS — F41.9 ANXIETY AND DEPRESSION: ICD-10-CM

## 2019-08-06 DIAGNOSIS — G89.29 CHRONIC MIDLINE LOW BACK PAIN WITH RIGHT-SIDED SCIATICA: ICD-10-CM

## 2019-08-06 DIAGNOSIS — R73.03 PREDIABETES: ICD-10-CM

## 2019-08-06 DIAGNOSIS — M54.41 CHRONIC MIDLINE LOW BACK PAIN WITH RIGHT-SIDED SCIATICA: ICD-10-CM

## 2019-08-06 DIAGNOSIS — T78.40XA RASH DUE TO ALLERGY: ICD-10-CM

## 2019-08-06 DIAGNOSIS — F33.41 RECURRENT MAJOR DEPRESSIVE DISORDER, IN PARTIAL REMISSION (HCC): ICD-10-CM

## 2019-08-06 DIAGNOSIS — I10 ESSENTIAL HYPERTENSION: Primary | ICD-10-CM

## 2019-08-06 DIAGNOSIS — R21 RASH DUE TO ALLERGY: ICD-10-CM

## 2019-08-06 PROBLEM — B37.31 CANDIDA VAGINITIS: Status: RESOLVED | Noted: 2018-10-30 | Resolved: 2019-08-06

## 2019-08-06 PROBLEM — E66.811 CLASS 1 OBESITY DUE TO EXCESS CALORIES WITHOUT SERIOUS COMORBIDITY WITH BODY MASS INDEX (BMI) OF 33.0 TO 33.9 IN ADULT: Status: ACTIVE | Noted: 2019-08-06

## 2019-08-06 PROCEDURE — 99214 OFFICE O/P EST MOD 30 MIN: CPT | Performed by: FAMILY MEDICINE

## 2019-08-06 RX ORDER — DIAPER,BRIEF,INFANT-TODD,DISP
EACH MISCELLANEOUS
Qty: 1 TUBE | Refills: 1 | Status: SHIPPED | OUTPATIENT
Start: 2019-08-06 | End: 2019-08-13

## 2019-08-06 RX ORDER — LORATADINE 10 MG/1
10 TABLET ORAL DAILY
Qty: 30 TABLET | Refills: 0 | Status: SHIPPED | OUTPATIENT
Start: 2019-08-06 | End: 2019-09-05

## 2019-08-06 RX ORDER — RISPERIDONE 2 MG/1
TABLET, FILM COATED ORAL
Refills: 2 | COMMUNITY
Start: 2019-07-21 | End: 2022-04-20

## 2019-08-06 RX ORDER — VENLAFAXINE HYDROCHLORIDE 75 MG/1
75 CAPSULE, EXTENDED RELEASE ORAL DAILY
Qty: 30 CAPSULE | Refills: 3 | Status: SHIPPED | OUTPATIENT
Start: 2019-08-06 | End: 2020-02-06 | Stop reason: SDUPTHER

## 2019-08-06 ASSESSMENT — ENCOUNTER SYMPTOMS
SINUS PAIN: 0
VOMITING: 0
PHOTOPHOBIA: 0
WHEEZING: 0
BACK PAIN: 1
ABDOMINAL DISTENTION: 0
RHINORRHEA: 0
SINUS PRESSURE: 0
BLOOD IN STOOL: 0
ROS SKIN COMMENTS: SKIN TAG
NAUSEA: 0
ABDOMINAL PAIN: 0
SHORTNESS OF BREATH: 0
CONSTIPATION: 0
COUGH: 0
CHEST TIGHTNESS: 0
DIARRHEA: 0
TROUBLE SWALLOWING: 0

## 2019-08-06 NOTE — PROGRESS NOTES
Refill: 3    3. Anxiety and depression  Continue same medications  4. Class 1 obesity due to excess calories without serious comorbidity with body mass index (BMI) of 33.0 to 33.9 in adult  Discussed about the diet and exercise. - Lipid Panel; Future    5. Prediabetes  Continue diet and exercise  - TSH With Reflex Ft4; Future  - Hemoglobin A1C; Future    6. Chronic midline low back pain with right-sided sciatica    Stable on current treatment  7. Skin tag  Removal in next week    8. Rash due to allergy  Likely heat related rash, Claritin and topical steroid. - loratadine (CLARITIN) 10 MG tablet; Take 1 tablet by mouth daily  Dispense: 30 tablet; Refill: 0  - hydrocortisone (ALA-JAKE) 1 % cream; Apply topically 2 times daily. Dispense: 1 Tube; Refill: 1      Orders Placed This Encounter   Procedures    Lipid Panel     Standing Status:   Future     Standing Expiration Date:   8/6/2020     Order Specific Question:   Is Patient Fasting?/# of Hours     Answer:   12    CBC     Standing Status:   Future     Standing Expiration Date:   8/6/2020    Comprehensive Metabolic Panel     Standing Status:   Future     Standing Expiration Date:   8/6/2020    TSH With Reflex Ft4     Standing Status:   Future     Standing Expiration Date:   8/6/2020    Hemoglobin A1C     Standing Status:   Future     Standing Expiration Date:   8/6/2020         Medications Discontinued During This Encounter   Medication Reason    meloxicam (MOBIC) 15 MG tablet Therapy completed    venlafaxine (EFFEXOR XR) 150 MG extended release capsule DUPLICATE    venlafaxine (EFFEXOR XR) 75 MG extended release capsule REORDER       Ean Polk received counseling on the following healthy behaviors: nutrition, exercise and medication adherence  Reviewed prior labs and health maintenance  Continue current medications, diet and exercise. Discussed use, benefit, and side effects of prescribed medications. Barriers to medication compliance addressed.    Patient

## 2019-08-13 ENCOUNTER — NURSE ONLY (OUTPATIENT)
Dept: FAMILY MEDICINE CLINIC | Age: 39
End: 2019-08-13
Payer: COMMERCIAL

## 2019-08-13 VITALS — DIASTOLIC BLOOD PRESSURE: 87 MMHG | HEART RATE: 104 BPM | SYSTOLIC BLOOD PRESSURE: 119 MMHG

## 2019-08-13 DIAGNOSIS — I10 ESSENTIAL HYPERTENSION: Primary | ICD-10-CM

## 2019-08-13 PROCEDURE — 99211 OFF/OP EST MAY X REQ PHY/QHP: CPT | Performed by: FAMILY MEDICINE

## 2019-08-24 DIAGNOSIS — M54.41 CHRONIC LOW BACK PAIN WITH RIGHT-SIDED SCIATICA, UNSPECIFIED BACK PAIN LATERALITY: ICD-10-CM

## 2019-08-24 DIAGNOSIS — G89.29 CHRONIC LOW BACK PAIN WITH RIGHT-SIDED SCIATICA, UNSPECIFIED BACK PAIN LATERALITY: ICD-10-CM

## 2019-08-26 RX ORDER — MELOXICAM 15 MG/1
TABLET ORAL
Qty: 30 TABLET | Refills: 1 | Status: SHIPPED | OUTPATIENT
Start: 2019-08-26 | End: 2019-12-23

## 2019-09-10 ENCOUNTER — OFFICE VISIT (OUTPATIENT)
Dept: FAMILY MEDICINE CLINIC | Age: 39
End: 2019-09-10
Payer: COMMERCIAL

## 2019-09-10 VITALS
SYSTOLIC BLOOD PRESSURE: 120 MMHG | DIASTOLIC BLOOD PRESSURE: 80 MMHG | BODY MASS INDEX: 33.27 KG/M2 | WEIGHT: 207 LBS | HEART RATE: 93 BPM | HEIGHT: 66 IN | OXYGEN SATURATION: 99 %

## 2019-09-10 DIAGNOSIS — D22.9 ATYPICAL MOLE: ICD-10-CM

## 2019-09-10 DIAGNOSIS — Z00.00 PREVENTATIVE HEALTH CARE: ICD-10-CM

## 2019-09-10 DIAGNOSIS — L81.2 FRECKLES: ICD-10-CM

## 2019-09-10 DIAGNOSIS — M25.50 ARTHRALGIA, UNSPECIFIED JOINT: ICD-10-CM

## 2019-09-10 DIAGNOSIS — L91.8 SKIN TAG: Primary | ICD-10-CM

## 2019-09-10 DIAGNOSIS — Z80.8 FAMILY HISTORY OF MELANOMA: ICD-10-CM

## 2019-09-10 PROCEDURE — 11200 RMVL SKIN TAGS UP TO&INC 15: CPT | Performed by: FAMILY MEDICINE

## 2019-09-10 PROCEDURE — 99214 OFFICE O/P EST MOD 30 MIN: CPT | Performed by: FAMILY MEDICINE

## 2019-09-10 RX ORDER — LIDOCAINE HYDROCHLORIDE 10 MG/ML
2 INJECTION, SOLUTION EPIDURAL; INFILTRATION; INTRACAUDAL; PERINEURAL ONCE
Status: COMPLETED | OUTPATIENT
Start: 2019-09-10 | End: 2019-09-10

## 2019-09-10 RX ADMIN — LIDOCAINE HYDROCHLORIDE 2 ML: 10 INJECTION, SOLUTION EPIDURAL; INFILTRATION; INTRACAUDAL; PERINEURAL at 13:16

## 2019-09-10 ASSESSMENT — ENCOUNTER SYMPTOMS
VOMITING: 0
WHEEZING: 0
COUGH: 0
BLOOD IN STOOL: 0
BACK PAIN: 1
SHORTNESS OF BREATH: 0
CONSTIPATION: 0
ABDOMINAL PAIN: 0
ABDOMINAL DISTENTION: 0

## 2019-09-10 ASSESSMENT — PATIENT HEALTH QUESTIONNAIRE - PHQ9
SUM OF ALL RESPONSES TO PHQ QUESTIONS 1-9: 1
2. FEELING DOWN, DEPRESSED OR HOPELESS: 1
1. LITTLE INTEREST OR PLEASURE IN DOING THINGS: 0
SUM OF ALL RESPONSES TO PHQ QUESTIONS 1-9: 1
SUM OF ALL RESPONSES TO PHQ9 QUESTIONS 1 & 2: 1

## 2019-09-10 NOTE — PROGRESS NOTES
Chief Complaint   Patient presents with    Skin Tag     removal on her back     Shoulder Pain     bilat - states the feel heavy     Hand Pain     bilat          Jessica Lab Amador  here today for follow up on chronic medical problems, go over labs and/or diagnostic studies, and medication refills. Skin Tag (removal on her back ); Shoulder Pain (bilat - states the feel heavy ); and Hand Pain (bilat )      HPI: Patient is here for skin tag removal has multiple skin tags one is on the back and one is on the chest.    Patient is also complaining of multiple skin  Problems reports she has multiple freckles which has been increasing both on face and lower extremities and also on upper extremities. She has thickening of skin, also has history of mole on the back. Patient does have family history of melanoma in her grandmother. She mostly dermatologist.    Patient also complains of multiple joint pains complains of bilateral shoulder pains, has also history of chronic low back pain cannot afford physical therapy. Has blood work ordered she has not done that needs evaluation for fibromyalgia. /80   Pulse 93   Ht 5' 6\" (1.676 m)   Wt 207 lb (93.9 kg)   LMP 09/05/2019 (Approximate)   SpO2 99%   BMI 33.41 kg/m²    Body mass index is 33.41 kg/m². Wt Readings from Last 3 Encounters:   09/10/19 207 lb (93.9 kg)   08/06/19 206 lb 6.4 oz (93.6 kg)   01/29/19 203 lb 6.4 oz (92.3 kg)        []Negative depression screening. PHQ Scores 9/10/2019 10/29/2018 1/31/2018 11/1/2017 5/24/2017 4/20/2017 11/11/2015   PHQ2 Score 1 6 5 3 4 6 0   PHQ9 Score 1 13 22 3 16 22 0      []1-4 = Minimal depression   []5-9 = Milddepression   [x]10-14 = Moderate depression   []15-19 = Moderately severe depression   []20-27 = Severe depression    Discussed testing with the patient and all questions fully answered.     Hospital Outpatient Visit on 10/29/2018   Component Date Value Ref Range Status    Specimen Description 10/29/2018 nervous/anxious and is not hyperactive. Physical Exam   Musculoskeletal:        Right shoulder: She exhibits decreased range of motion, tenderness, deformity, pain and spasm. Left shoulder: She exhibits decreased range of motion, tenderness, pain and spasm. Lumbar back: She exhibits decreased range of motion, tenderness, pain and spasm. Skin:        Patient has 2 skin tags one on the back about 2 cm and one on the chest about 0.5 cm       PHYSICAL EXAM:   VITALS:   Vitals:    09/10/19 1145   BP: 120/80   Pulse: 93   SpO2: 99%     GENERAL:  Patient is a well-developed, well-nourished female  in no acute distress, alert and oriented x3, appropriate and pleasant conversation. HEAD: Normocephalic, atraumatic. EYES: Pupils equal, round and reactive to light and accommodation, extraocular   movements intact. ENT: Moist mucous membranes. No erythema is noted. NECK: Supple. No masses. No lymphadenopathy. CARDIOVASCULAR: Regular rate and rhythm. PULMONARY: Lungs are clear to auscultation bilaterally. ABDOMEN: Soft, nontender, nondistended. Positive bowel sounds. NEUROLOGIC: Cranial nerves II through XII grossly intact. No focal deficits are noted. Procedure Note      Denton Bustos  1980  [id number]    Pre-operative Diagnosis: skin tags     Post-operative Diagnosis: Same    Procedure: Skin tag on the back was big, patient could not tolerate without anesthesia, 1% lidocaine was injected. Skin tag was removed with scissors and forceps. Hemostasis was achieved silver nitrate strips. Another skin tag was removed from chest.    Anesthesia: Local    Attending/Resident/Assistants:  att. providers found, Estil Falling,      Estimated Blood Loss: Minimal    Complications: none    Specimens: were not obtained       Estil Falling  9/10/2019         ASSESSMENT AND PLAN      1. Skin tag  Skin tags removed  - lidocaine PF 1 % injection 2 mL    2.  Elle  Referral to

## 2019-09-17 ENCOUNTER — HOSPITAL ENCOUNTER (OUTPATIENT)
Age: 39
Discharge: HOME | End: 2019-09-17
Payer: COMMERCIAL

## 2019-09-17 DIAGNOSIS — Z00.00 PREVENTATIVE HEALTH CARE: ICD-10-CM

## 2019-09-17 DIAGNOSIS — R73.03 PREDIABETES: ICD-10-CM

## 2019-09-17 DIAGNOSIS — M25.50 ARTHRALGIA, UNSPECIFIED JOINT: ICD-10-CM

## 2019-09-17 DIAGNOSIS — I10 ESSENTIAL HYPERTENSION: ICD-10-CM

## 2019-09-17 DIAGNOSIS — E66.09 CLASS 1 OBESITY DUE TO EXCESS CALORIES WITHOUT SERIOUS COMORBIDITY WITH BODY MASS INDEX (BMI) OF 33.0 TO 33.9 IN ADULT: ICD-10-CM

## 2019-09-17 LAB
ALBUMIN SERPL-MCNC: 4.3 G/DL (ref 3.5–5.2)
ALBUMIN/GLOBULIN RATIO: ABNORMAL (ref 1–2.5)
ALP BLD-CCNC: 49 U/L (ref 35–104)
ALT SERPL-CCNC: 15 U/L (ref 5–33)
ANION GAP SERPL CALCULATED.3IONS-SCNC: 13 MMOL/L (ref 9–17)
AST SERPL-CCNC: 12 U/L
BILIRUB SERPL-MCNC: 0.3 MG/DL (ref 0.3–1.2)
BUN BLDV-MCNC: 9 MG/DL (ref 6–20)
BUN/CREAT BLD: ABNORMAL (ref 9–20)
CALCIUM SERPL-MCNC: 9.2 MG/DL (ref 8.6–10.4)
CHLORIDE BLD-SCNC: 101 MMOL/L (ref 98–107)
CHOLESTEROL/HDL RATIO: 4
CHOLESTEROL: 164 MG/DL
CO2: 25 MMOL/L (ref 20–31)
CREAT SERPL-MCNC: 0.73 MG/DL (ref 0.5–0.9)
ESTIMATED AVERAGE GLUCOSE: 123 MG/DL
GFR AFRICAN AMERICAN: >60 ML/MIN
GFR NON-AFRICAN AMERICAN: >60 ML/MIN
GFR SERPL CREATININE-BSD FRML MDRD: ABNORMAL ML/MIN/{1.73_M2}
GFR SERPL CREATININE-BSD FRML MDRD: ABNORMAL ML/MIN/{1.73_M2}
GLUCOSE BLD-MCNC: 122 MG/DL (ref 70–99)
HBA1C MFR BLD: 5.9 % (ref 4–6)
HCT VFR BLD CALC: 39 % (ref 36–46)
HDLC SERPL-MCNC: 41 MG/DL
HEMOGLOBIN: 13.5 G/DL (ref 12–16)
LDL CHOLESTEROL: 96 MG/DL (ref 0–130)
MCH RBC QN AUTO: 29.8 PG (ref 26–34)
MCHC RBC AUTO-ENTMCNC: 34.6 G/DL (ref 31–37)
MCV RBC AUTO: 86 FL (ref 80–100)
NRBC AUTOMATED: NORMAL PER 100 WBC
PDW BLD-RTO: 12.8 % (ref 11.5–14.9)
PLATELET # BLD: 200 K/UL (ref 150–450)
PMV BLD AUTO: 7.7 FL (ref 6–12)
POTASSIUM SERPL-SCNC: 4 MMOL/L (ref 3.7–5.3)
RBC # BLD: 4.53 M/UL (ref 4–5.2)
SEDIMENTATION RATE, ERYTHROCYTE: 4 MM (ref 0–20)
SODIUM BLD-SCNC: 139 MMOL/L (ref 135–144)
TOTAL PROTEIN: 6.7 G/DL (ref 6.4–8.3)
TRIGL SERPL-MCNC: 135 MG/DL
TSH SERPL DL<=0.05 MIU/L-ACNC: 1.68 MIU/L (ref 0.3–5)
VLDLC SERPL CALC-MCNC: NORMAL MG/DL (ref 1–30)
WBC # BLD: 4.9 K/UL (ref 3.5–11)

## 2019-09-17 PROCEDURE — 85651 RBC SED RATE NONAUTOMATED: CPT

## 2019-09-17 PROCEDURE — 84443 ASSAY THYROID STIM HORMONE: CPT

## 2019-09-17 PROCEDURE — 36415 COLL VENOUS BLD VENIPUNCTURE: CPT

## 2019-09-17 PROCEDURE — 80053 COMPREHEN METABOLIC PANEL: CPT

## 2019-09-17 PROCEDURE — 83036 HEMOGLOBIN GLYCOSYLATED A1C: CPT

## 2019-09-17 PROCEDURE — 86038 ANTINUCLEAR ANTIBODIES: CPT

## 2019-09-17 PROCEDURE — 86787 VARICELLA-ZOSTER ANTIBODY: CPT

## 2019-09-17 PROCEDURE — 80061 LIPID PANEL: CPT

## 2019-09-17 PROCEDURE — 85027 COMPLETE CBC AUTOMATED: CPT

## 2019-09-18 LAB
ANTI-NUCLEAR ANTIBODY (ANA): NEGATIVE
VZV IGG SER QL IA: 1.93

## 2019-10-02 DIAGNOSIS — F33.41 RECURRENT MAJOR DEPRESSIVE DISORDER, IN PARTIAL REMISSION (HCC): ICD-10-CM

## 2019-10-02 DIAGNOSIS — Z13.31 POSITIVE DEPRESSION SCREENING: ICD-10-CM

## 2019-10-02 RX ORDER — VENLAFAXINE HYDROCHLORIDE 150 MG/1
CAPSULE, EXTENDED RELEASE ORAL
Qty: 30 CAPSULE | Refills: 1 | Status: SHIPPED | OUTPATIENT
Start: 2019-10-02 | End: 2019-12-23

## 2019-10-08 ENCOUNTER — OFFICE VISIT (OUTPATIENT)
Dept: FAMILY MEDICINE CLINIC | Age: 39
End: 2019-10-08
Payer: COMMERCIAL

## 2019-10-08 VITALS
WEIGHT: 210.8 LBS | OXYGEN SATURATION: 98 % | HEART RATE: 90 BPM | SYSTOLIC BLOOD PRESSURE: 119 MMHG | DIASTOLIC BLOOD PRESSURE: 81 MMHG | HEIGHT: 66 IN | BODY MASS INDEX: 33.88 KG/M2

## 2019-10-08 DIAGNOSIS — M25.50 PAIN IN JOINT INVOLVING MULTIPLE SITES: ICD-10-CM

## 2019-10-08 DIAGNOSIS — M79.7 FIBROMYALGIA: Primary | ICD-10-CM

## 2019-10-08 DIAGNOSIS — R73.03 PREDIABETES: ICD-10-CM

## 2019-10-08 PROCEDURE — 99213 OFFICE O/P EST LOW 20 MIN: CPT | Performed by: FAMILY MEDICINE

## 2019-10-08 RX ORDER — GABAPENTIN 300 MG/1
300 CAPSULE ORAL 2 TIMES DAILY
Qty: 60 CAPSULE | Refills: 0 | Status: SHIPPED | OUTPATIENT
Start: 2019-10-08 | End: 2019-12-16 | Stop reason: SDUPTHER

## 2019-10-08 ASSESSMENT — ENCOUNTER SYMPTOMS
COUGH: 0
SHORTNESS OF BREATH: 0
CHEST TIGHTNESS: 0
WHEEZING: 0
BACK PAIN: 1

## 2019-12-16 DIAGNOSIS — M25.50 PAIN IN JOINT INVOLVING MULTIPLE SITES: ICD-10-CM

## 2019-12-16 DIAGNOSIS — M79.7 FIBROMYALGIA: ICD-10-CM

## 2019-12-16 RX ORDER — GABAPENTIN 300 MG/1
CAPSULE ORAL
Qty: 60 CAPSULE | Refills: 0 | Status: SHIPPED | OUTPATIENT
Start: 2019-12-16 | End: 2020-02-06

## 2019-12-22 DIAGNOSIS — F33.41 RECURRENT MAJOR DEPRESSIVE DISORDER, IN PARTIAL REMISSION (HCC): ICD-10-CM

## 2019-12-22 DIAGNOSIS — G89.29 CHRONIC LOW BACK PAIN WITH RIGHT-SIDED SCIATICA, UNSPECIFIED BACK PAIN LATERALITY: ICD-10-CM

## 2019-12-22 DIAGNOSIS — Z13.31 POSITIVE DEPRESSION SCREENING: ICD-10-CM

## 2019-12-22 DIAGNOSIS — M54.41 CHRONIC LOW BACK PAIN WITH RIGHT-SIDED SCIATICA, UNSPECIFIED BACK PAIN LATERALITY: ICD-10-CM

## 2019-12-23 RX ORDER — MELOXICAM 15 MG/1
TABLET ORAL
Qty: 30 TABLET | Refills: 0 | Status: SHIPPED | OUTPATIENT
Start: 2019-12-23 | End: 2020-01-03

## 2019-12-23 RX ORDER — VENLAFAXINE HYDROCHLORIDE 150 MG/1
CAPSULE, EXTENDED RELEASE ORAL
Qty: 30 CAPSULE | Refills: 0 | Status: SHIPPED | OUTPATIENT
Start: 2019-12-23 | End: 2019-12-26

## 2019-12-26 DIAGNOSIS — Z13.31 POSITIVE DEPRESSION SCREENING: ICD-10-CM

## 2019-12-26 DIAGNOSIS — F33.41 RECURRENT MAJOR DEPRESSIVE DISORDER, IN PARTIAL REMISSION (HCC): ICD-10-CM

## 2019-12-26 RX ORDER — VENLAFAXINE HYDROCHLORIDE 150 MG/1
CAPSULE, EXTENDED RELEASE ORAL
Qty: 30 CAPSULE | Refills: 0 | Status: SHIPPED | OUTPATIENT
Start: 2019-12-26 | End: 2020-02-06 | Stop reason: SDUPTHER

## 2020-01-03 RX ORDER — MELOXICAM 15 MG/1
TABLET ORAL
Qty: 30 TABLET | Refills: 0 | Status: SHIPPED | OUTPATIENT
Start: 2020-01-03 | End: 2020-02-06 | Stop reason: SDUPTHER

## 2020-02-06 ENCOUNTER — OFFICE VISIT (OUTPATIENT)
Dept: FAMILY MEDICINE CLINIC | Age: 40
End: 2020-02-06
Payer: COMMERCIAL

## 2020-02-06 VITALS
HEIGHT: 66 IN | DIASTOLIC BLOOD PRESSURE: 80 MMHG | OXYGEN SATURATION: 98 % | SYSTOLIC BLOOD PRESSURE: 120 MMHG | WEIGHT: 215 LBS | BODY MASS INDEX: 34.55 KG/M2 | HEART RATE: 102 BPM

## 2020-02-06 PROBLEM — G56.03 BILATERAL CARPAL TUNNEL SYNDROME: Status: ACTIVE | Noted: 2020-02-06

## 2020-02-06 PROBLEM — M67.40 GANGLION CYST: Status: ACTIVE | Noted: 2020-02-06

## 2020-02-06 PROCEDURE — 99214 OFFICE O/P EST MOD 30 MIN: CPT | Performed by: FAMILY MEDICINE

## 2020-02-06 RX ORDER — VENLAFAXINE HYDROCHLORIDE 150 MG/1
CAPSULE, EXTENDED RELEASE ORAL
Qty: 30 CAPSULE | Refills: 0 | Status: SHIPPED | OUTPATIENT
Start: 2020-02-06 | End: 2020-03-02

## 2020-02-06 RX ORDER — TRAZODONE HYDROCHLORIDE 100 MG/1
TABLET ORAL
COMMUNITY
Start: 2019-12-26

## 2020-02-06 RX ORDER — VENLAFAXINE HYDROCHLORIDE 75 MG/1
75 CAPSULE, EXTENDED RELEASE ORAL DAILY
Qty: 30 CAPSULE | Refills: 3 | Status: SHIPPED | OUTPATIENT
Start: 2020-02-06 | End: 2020-04-10 | Stop reason: SDUPTHER

## 2020-02-06 RX ORDER — LISINOPRIL 5 MG/1
TABLET ORAL
Qty: 90 TABLET | Refills: 3 | Status: SHIPPED | OUTPATIENT
Start: 2020-02-06 | End: 2021-03-02

## 2020-02-06 RX ORDER — TIZANIDINE 4 MG/1
4 TABLET ORAL DAILY PRN
Qty: 30 TABLET | Refills: 0 | Status: SHIPPED | OUTPATIENT
Start: 2020-02-06 | End: 2020-03-02

## 2020-02-06 RX ORDER — GABAPENTIN 300 MG/1
CAPSULE ORAL
Qty: 90 CAPSULE | Refills: 0 | Status: SHIPPED | OUTPATIENT
Start: 2020-02-06 | End: 2020-05-19 | Stop reason: ALTCHOICE

## 2020-02-06 RX ORDER — MELOXICAM 15 MG/1
TABLET ORAL
Qty: 30 TABLET | Refills: 0 | Status: SHIPPED | OUTPATIENT
Start: 2020-02-06 | End: 2020-03-02

## 2020-02-06 ASSESSMENT — ENCOUNTER SYMPTOMS
SHORTNESS OF BREATH: 0
SINUS PAIN: 0
PHOTOPHOBIA: 0
DIARRHEA: 0
VOMITING: 0
ABDOMINAL DISTENTION: 0
CONSTIPATION: 0
SINUS PRESSURE: 0
BACK PAIN: 1
CHEST TIGHTNESS: 0
ABDOMINAL PAIN: 0
WHEEZING: 0
COUGH: 0

## 2020-02-06 NOTE — PROGRESS NOTES
Chief Complaint   Patient presents with    Other     fibromyalgia follow up     Other     declined to have a1c done today does not want to be billed for it          Heena Calhoun  here today for follow up on chronic medical problems, go over labs and/or diagnostic studies, and medication refills. Other (fibromyalgia follow up ) and Other (declined to have a1c done today does not want to be billed for it )      HPI: Patient is here for follow-up on hypertension chronic low back pain fibromyalgia. Hypertension is controlled denies any chest pain shortness of breath. Chronic low back pain stable on current treatment, patient reports her fibromyalgia is acting up in winter. She feels pain and numbness in both hands which is more as compared to other joints. Currently she is on Neurontin and was also on Zanaflex but she did not refill that since months. Patient reports Neurontin is helping but pain comes back. 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 She also feels fatigued and tired, she drives 1 hour for work and has to allow her shift. She wakes up at 4:00 in the morning. Patient has bilateral wrist pain also has noticed small swelling in left wrist which resolves on full flexion and is more prominent on extension. Patient complains of mild pain, swelling is not increasing in size, noticed 2 months before. Patient also complains of numbness in fingers and near the thumb. She takes NSAIDs as needed. Patient follows with psychiatrist is on multiple medications , depression stable, takes trazodone for sleep which helps. /80   Pulse 102   Ht 5' 6\" (1.676 m)   Wt 215 lb (97.5 kg)   LMP 01/12/2020 (Approximate)   SpO2 98%   BMI 34.70 kg/m²    Body mass index is 34.7 kg/m².   Wt Readings from Last 3 Encounters:   02/06/20 215 lb (97.5 kg)   10/08/19 210 lb 12.8 oz (95.6 kg)   09/10/19 207 lb (93.9 kg)        []Negative depression interacts with trazodone 30 capsule 0    venlafaxine (EFFEXOR XR) 75 MG extended release capsule Take 1 capsule by mouth daily ----Increase ( Hassupply of  37.5mg caps) 30 capsule 3    meloxicam (MOBIC) 15 MG tablet TAKE 1 TABLET BY MOUTH ONE TIME A DAY AS NEEDED FOR BACK PAIN 30 tablet 0    lisinopril (PRINIVIL;ZESTRIL) 5 MG tablet TAKE 1 TABLET BY MOUTH ONE TIME A DAY 90 tablet 3    gabapentin (NEURONTIN) 300 MG capsule TAKE 1 CAPSULE BY MOUTH THREE  TIMES A DAY FOR 30 DAYS 90 capsule 0    Elastic Bandages & Supports (WRIST BRACE/LEFT MEDIUM) MISC USE DAILY FOR HAND PAIN 1 each 0    Elastic Bandages & Supports (WRIST BRACE/RIGHT MEDIUM) MISC USE DAILY FOR HAND PAIN 1 each 0    tiZANidine (ZANAFLEX) 4 MG tablet Take 1 tablet by mouth daily as needed (low back pain) 30 tablet 0    risperiDONE (RISPERDAL) 2 MG tablet TAKE 1 TABLET BY MOUTH AT BEDTIME  2    amphetamine-dextroamphetamine (ADDERALL) 7.5 MG tablet TAKE 1 TABLET BY MOUTH TWO TIMES A DAY  0    Cholecalciferol (VITAMIN D3) 5000 units CAPS TAKE 1 CAPSULE BY MOUTH ONE TIME A DAY  1     No current facility-administered medications for this visit.               Social History     Socioeconomic History    Marital status:      Spouse name: Not on file    Number of children: Not on file    Years of education: Not on file    Highest education level: Not on file   Occupational History    Not on file   Social Needs    Financial resource strain: Not on file    Food insecurity:     Worry: Not on file     Inability: Not on file    Transportation needs:     Medical: Not on file     Non-medical: Not on file   Tobacco Use    Smoking status: Never Smoker    Smokeless tobacco: Never Used   Substance and Sexual Activity    Alcohol use: No     Alcohol/week: 0.0 standard drinks    Drug use: No    Sexual activity: Yes     Partners: Male   Lifestyle    Physical activity:     Days per week: Not on file     Minutes per session: Not on file    Stress: and reactive to light and accommodation, extraocular   movements intact. ENT: Moist mucous membranes. No erythema is noted. NECK: Supple. No masses. No lymphadenopathy. CARDIOVASCULAR: Regular rate and rhythm. PULMONARY: Lungs are clear to auscultation bilaterally. ABDOMEN: Soft, nontender, nondistended. Positive bowel sounds. NEUROLOGIC: Cranial nerves II through XII grossly intact. No focal deficits are noted. ASSESSMENT AND PLAN      1. Essential hypertension  -Controlled continue same medications  - lisinopril (PRINIVIL;ZESTRIL) 5 MG tablet; TAKE 1 TABLET BY MOUTH ONE TIME A DAY  Dispense: 90 tablet; Refill: 3    2. Chronic midline low back pain with right-sided sciatica  -Stable refill muscle relaxant increase Neurontin to 3 times daily discussed about Cymbalta and Lyrica if this does not help  - meloxicam (MOBIC) 15 MG tablet; TAKE 1 TABLET BY MOUTH ONE TIME A DAY AS NEEDED FOR BACK PAIN  Dispense: 30 tablet; Refill: 0  - gabapentin (NEURONTIN) 300 MG capsule; TAKE 1 CAPSULE BY MOUTH THREE  TIMES A DAY FOR 30 DAYS  Dispense: 90 capsule; Refill: 0  - tiZANidine (ZANAFLEX) 4 MG tablet; Take 1 tablet by mouth daily as needed (low back pain)  Dispense: 30 tablet; Refill: 0    3. Fibromyalgia  Increase Neurontin to 3 times, start on muscle relaxant  - gabapentin (NEURONTIN) 300 MG capsule; TAKE 1 CAPSULE BY MOUTH THREE  TIMES A DAY FOR 30 DAYS  Dispense: 90 capsule; Refill: 0  - tiZANidine (ZANAFLEX) 4 MG tablet; Take 1 tablet by mouth daily as needed (low back pain)  Dispense: 30 tablet; Refill: 0    4. Recurrent major depressive disorder, in partial remission [facility]  -Stable continue same medications  - venlafaxine (EFFEXOR XR) 150 MG extended release capsule; TAKE 1 CAPSULE BY MOUTH ONE TIME A DAY along with 75 mg capsule, interacts with trazodone  Dispense: 30 capsule; Refill: 0  - venlafaxine (EFFEXOR XR) 75 MG extended release capsule;  Take 1 capsule by mouth daily ----Increase ( Hassupply of (EFFEXOR XR) 150 MG extended release capsule 30 capsule 0     Sig: TAKE 1 CAPSULE BY MOUTH ONE TIME A DAY along with 75 mg capsule, interacts with trazodone    venlafaxine (EFFEXOR XR) 75 MG extended release capsule 30 capsule 3     Sig: Take 1 capsule by mouth daily ----Increase ( Hassupply of  37.5mg caps)    meloxicam (MOBIC) 15 MG tablet 30 tablet 0     Sig: TAKE 1 TABLET BY MOUTH ONE TIME A DAY AS NEEDED FOR BACK PAIN    lisinopril (PRINIVIL;ZESTRIL) 5 MG tablet 90 tablet 3     Sig: TAKE 1 TABLET BY MOUTH ONE TIME A DAY    gabapentin (NEURONTIN) 300 MG capsule 90 capsule 0     Sig: TAKE 1 CAPSULE BY MOUTH THREE  TIMES A DAY FOR 30 DAYS    Elastic Bandages & Supports (WRIST BRACE/LEFT MEDIUM) MISC 1 each 0     Sig: USE DAILY FOR HAND PAIN    Elastic Bandages & Supports (WRIST BRACE/RIGHT MEDIUM) MISC 1 each 0     Sig: USE DAILY FOR HAND PAIN    tiZANidine (ZANAFLEX) 4 MG tablet 30 tablet 0     Sig: Take 1 tablet by mouth daily as needed (low back pain)       All patient questions answered. Patient voiced understanding. Quality Measures    Body mass index is 34.7 kg/m². Elevated. Weight control planned discussed Healthy diet and regular exercise. BP: 120/80 Blood pressure is normal. Treatment plan consists of No treatment change needed. Lab Results   Component Value Date    LDLCHOLESTEROL 96 09/17/2019    (goal LDL reduction with dx if diabetes is 50% LDL reduction)      PHQ Scores 9/10/2019 10/29/2018 1/31/2018 11/1/2017 5/24/2017 4/20/2017 11/11/2015   PHQ2 Score 1 6 5 3 4 6 0   PHQ9 Score 1 13 22 3 16 22 0     Interpretation of Total Score Depression Severity: 1-4 = Minimal depression, 5-9 = Mild depression, 10-14 = Moderate depression, 15-19 = Moderately severe depression, 20-27 = Severe depression    The patient'spast medical, surgical, social, and family history as well as her   current medications and allergies were reviewed as documented in today's encounter.       Medications, labs, diagnostic studies, consultations andfollow-up as documented in this encounter. Return in about 6 months (around 8/6/2020). Patient wasseen with total face to face time of 25 minutes. More than 50% of this visit was counseling and education. No future appointments. This note was completed by using the assistance of a speech-recognition program. However, inadvertent computerized transcription errors may be present. Althoughevery effort was made to ensure accuracy, no guarantees can be provided that every mistake has been identified and corrected by editing.   Electronically signed by Lis Interiano MD on 2/6/2020  1:00 PM

## 2020-02-07 ENCOUNTER — TELEPHONE (OUTPATIENT)
Dept: FAMILY MEDICINE CLINIC | Age: 40
End: 2020-02-07

## 2020-03-02 RX ORDER — VENLAFAXINE HYDROCHLORIDE 150 MG/1
CAPSULE, EXTENDED RELEASE ORAL
Qty: 30 CAPSULE | Refills: 0 | Status: SHIPPED | OUTPATIENT
Start: 2020-03-02 | End: 2020-04-10

## 2020-03-02 RX ORDER — MELOXICAM 15 MG/1
TABLET ORAL
Qty: 30 TABLET | Refills: 0 | Status: SHIPPED | OUTPATIENT
Start: 2020-03-02 | End: 2020-06-18

## 2020-03-02 RX ORDER — TIZANIDINE 4 MG/1
TABLET ORAL
Qty: 30 TABLET | Refills: 0 | Status: SHIPPED | OUTPATIENT
Start: 2020-03-02

## 2020-05-19 ENCOUNTER — TELEMEDICINE (OUTPATIENT)
Dept: FAMILY MEDICINE CLINIC | Age: 40
End: 2020-05-19
Payer: COMMERCIAL

## 2020-05-19 VITALS
BODY MASS INDEX: 34.39 KG/M2 | SYSTOLIC BLOOD PRESSURE: 128 MMHG | DIASTOLIC BLOOD PRESSURE: 73 MMHG | HEART RATE: 87 BPM | HEIGHT: 66 IN | TEMPERATURE: 97.5 F | WEIGHT: 214 LBS

## 2020-05-19 PROCEDURE — 99214 OFFICE O/P EST MOD 30 MIN: CPT | Performed by: FAMILY MEDICINE

## 2020-05-19 RX ORDER — PREGABALIN 100 MG/1
100 CAPSULE ORAL 2 TIMES DAILY
Qty: 60 CAPSULE | Refills: 0 | Status: SHIPPED | OUTPATIENT
Start: 2020-05-19 | End: 2020-06-18

## 2020-05-19 ASSESSMENT — ENCOUNTER SYMPTOMS
RHINORRHEA: 0
BLOOD IN STOOL: 0
PHOTOPHOBIA: 0
ABDOMINAL PAIN: 0
CONSTIPATION: 0
DIARRHEA: 0
CHEST TIGHTNESS: 0
SHORTNESS OF BREATH: 0
ABDOMINAL DISTENTION: 0
COLOR CHANGE: 0
BACK PAIN: 1
WHEEZING: 0
COUGH: 0

## 2020-05-19 NOTE — PROGRESS NOTES
Precharting complete.
Positive for behavioral problems and decreased concentration. Negative for agitation, dysphoric mood and sleep disturbance. The patient is not nervous/anxious. Prior to Visit Medications    Medication Sig Taking? Authorizing Provider   pregabalin (LYRICA) 100 MG capsule Take 1 capsule by mouth 2 times daily for 30 days. Yes Franco Cedeño MD   venlafaxine (EFFEXOR XR) 150 MG extended release capsule TAKE 1 CAPSULE BY MOUTH ONE TIME A DAY ALONG WITH 75MG CAP. Yes Franco Cedeño MD   meloxicam (MOBIC) 15 MG tablet TAKE 1 TABLET BY MOUTH ONE TIME A DAY AS NEEDED FOR BACK PAIN. Yes Franco Cedeño MD   tiZANidine (ZANAFLEX) 4 MG tablet TAKE 1 TABLET BY MOUTH ONE TIME A DAY AS NEEDED FOR LOW BACK PAIN.  Yes Franco Cedeño MD   traZODone (DESYREL) 100 MG tablet  Yes Historical Provider, MD   lisinopril (PRINIVIL;ZESTRIL) 5 MG tablet TAKE 1 TABLET BY MOUTH ONE TIME A DAY Yes Franco Cedeño MD   Elastic Bandages & Supports (WRIST BRACE/LEFT MEDIUM) MISC USE DAILY FOR HAND PAIN Yes Franco Cedeño MD   Elastic Bandages & Supports (WRIST BRACE/RIGHT MEDIUM) MISC USE DAILY FOR HAND PAIN Yes Franco Cedeño MD   risperiDONE (RISPERDAL) 2 MG tablet TAKE 1 TABLET BY MOUTH AT BEDTIME Yes Historical Provider, MD   amphetamine-dextroamphetamine (ADDERALL) 7.5 MG tablet TAKE 1 TABLET BY MOUTH TWO TIMES A DAY Yes Historical Provider, MD   Cholecalciferol (VITAMIN D3) 5000 units CAPS TAKE 1 CAPSULE BY MOUTH ONE TIME A DAY  Historical Provider, MD       Social History     Tobacco Use    Smoking status: Never Smoker    Smokeless tobacco: Never Used   Substance Use Topics    Alcohol use: No     Alcohol/week: 0.0 standard drinks    Drug use: No        No Known Allergies,   Past Medical History:   Diagnosis Date    Anxiety and depression     Chronic back pain     Chronic low back pain with right-sided sciatica     Class 1 obesity due to excess calories without serious comorbidity with body mass index (BMI) of 33.0 to 33.9 in

## 2020-06-18 RX ORDER — MELOXICAM 15 MG/1
TABLET ORAL
Qty: 30 TABLET | Refills: 0 | Status: SHIPPED | OUTPATIENT
Start: 2020-06-18 | End: 2020-08-25

## 2020-06-18 RX ORDER — PREGABALIN 100 MG/1
CAPSULE ORAL
Qty: 60 CAPSULE | Refills: 0 | Status: SHIPPED | OUTPATIENT
Start: 2020-06-18 | End: 2020-08-18 | Stop reason: SDUPTHER

## 2020-07-13 RX ORDER — VENLAFAXINE HYDROCHLORIDE 75 MG/1
CAPSULE, EXTENDED RELEASE ORAL
Qty: 30 CAPSULE | Refills: 0 | Status: SHIPPED | OUTPATIENT
Start: 2020-07-13 | End: 2020-08-18

## 2020-07-13 NOTE — TELEPHONE ENCOUNTER
Last seen 5/19./20    Next Visit Date:  Future Appointments   Date Time Provider Peggy Tineo   8/18/2020 11:30 AM Brett Allen MD [facility] [address] Maintenance   Topic Date Due    Flu vaccine (1) 09/01/2020    A1C test (Diabetic or Prediabetic)  09/17/2020    Potassium monitoring  09/17/2020    Creatinine monitoring  09/17/2020    Cervical cancer screen  11/11/2020    DTaP/Tdap/Td vaccine (2 - Td) 04/03/2028    HIV screen  Completed    Hepatitis A vaccine  Aged Out    Hepatitis B vaccine  Aged Out    Hib vaccine  Aged Out    Meningococcal (ACWY) vaccine  Aged Out    Pneumococcal 0-64 years Vaccine  Aged Out    Varicella vaccine  Discontinued       Hemoglobin A1C (%)   Date Value   09/17/2019 5.9   09/17/2018 5.8   04/05/2018 5.8             ( goal A1C is < 7)   No results found for: LABMICR  LDL Cholesterol (mg/dL)   Date Value   09/17/2019 96       (goal LDL is <100)   AST (U/L)   Date Value   09/17/2019 12     ALT (U/L)   Date Value   09/17/2019 15     BUN (mg/dL)   Date Value   09/17/2019 9     BP Readings from Last 3 Encounters:   05/19/20 128/73   02/06/20 120/80   10/08/19 119/81          (goal 120/80)    All Future Testing planned in [facility]  Lab Frequency Next Occurrence               Patient Active Problem List:     Depression     Chronic low back pain with right-sided sciatica     Anxiety and depression     Recurrent major depressive disorder, in partial remission (HCC)     Sacroiliac joint pain     Onychomycosis     Major depressive disorder, recurrent, severe without psychotic features ([facility]     Essential hypertension     Prediabetes     Rash due to allergy     Class 1 obesity due to excess calories without serious comorbidity with body mass index (BMI) of 33.0 to 33.9 in adult     Skin tag     Freckles     Atypical mole     Family history of melanoma     Pain in joint involving multiple sites     Fibromyalgia     Bilateral carpal tunnel syndrome     Ganglion cyst

## 2020-08-18 ENCOUNTER — OFFICE VISIT (OUTPATIENT)
Dept: FAMILY MEDICINE CLINIC | Age: 40
End: 2020-08-18
Payer: COMMERCIAL

## 2020-08-18 VITALS
OXYGEN SATURATION: 98 % | SYSTOLIC BLOOD PRESSURE: 118 MMHG | HEART RATE: 104 BPM | WEIGHT: 210.4 LBS | BODY MASS INDEX: 33.96 KG/M2 | DIASTOLIC BLOOD PRESSURE: 82 MMHG | TEMPERATURE: 97.4 F

## 2020-08-18 PROCEDURE — 99214 OFFICE O/P EST MOD 30 MIN: CPT | Performed by: FAMILY MEDICINE

## 2020-08-18 RX ORDER — VENLAFAXINE HYDROCHLORIDE 75 MG/1
CAPSULE, EXTENDED RELEASE ORAL
Qty: 30 CAPSULE | Refills: 3 | Status: SHIPPED | OUTPATIENT
Start: 2020-08-18 | End: 2020-12-28

## 2020-08-18 RX ORDER — PREGABALIN 150 MG/1
CAPSULE ORAL
Qty: 60 CAPSULE | Refills: 0 | Status: SHIPPED | OUTPATIENT
Start: 2020-08-18 | End: 2020-09-22

## 2020-08-18 ASSESSMENT — ENCOUNTER SYMPTOMS
RHINORRHEA: 0
CONSTIPATION: 0
PHOTOPHOBIA: 0
WHEEZING: 0
COUGH: 0
BACK PAIN: 1
ABDOMINAL PAIN: 0
ABDOMINAL DISTENTION: 0
SHORTNESS OF BREATH: 0
SINUS PRESSURE: 0
DIARRHEA: 0

## 2020-08-18 ASSESSMENT — PATIENT HEALTH QUESTIONNAIRE - PHQ9
1. LITTLE INTEREST OR PLEASURE IN DOING THINGS: 1
SUM OF ALL RESPONSES TO PHQ QUESTIONS 1-9: 2
2. FEELING DOWN, DEPRESSED OR HOPELESS: 1
SUM OF ALL RESPONSES TO PHQ QUESTIONS 1-9: 2
SUM OF ALL RESPONSES TO PHQ9 QUESTIONS 1 & 2: 2

## 2020-08-18 NOTE — PROGRESS NOTES
Chief Complaint   Patient presents with    Hypertension         Susan Cooper  here today for follow up on chronic medical problems, go over labs and/or diagnostic studies, and medication refills. Hypertension      HPI: Patient is here for follow-up for fibromyalgia, was started on Lyrica reports that is helping. She was started on 100 mg dose reports needs to be increased especially at night she feels pain. Patient is also muscle medicine that helps. Neurontin was discontinued. Hypertension controlled denies any chest pain shortness of breath. Patient needs yearly blood work. Depression stable on current treatment. Chronic low back pain, Lyrica has improved her pain. /82 (Site: Left Upper Arm, Position: Sitting, Cuff Size: Large Adult)   Pulse 104   Temp 97.4 °F (36.3 °C)   Wt 210 lb 6.4 oz (95.4 kg)   SpO2 98%   BMI 33.96 kg/m²    Body mass index is 33.96 kg/m². Wt Readings from Last 3 Encounters:   08/18/20 210 lb 6.4 oz (95.4 kg)   05/19/20 214 lb (97.1 kg)   02/06/20 215 lb (97.5 kg)        []Negative depression screening. PHQ Scores 8/18/2020 9/10/2019 10/29/2018 1/31/2018 11/1/2017 5/24/2017 4/20/2017   PHQ2 Score 2 1 6 5 3 4 6   PHQ9 Score 2 1 13 22 3 16 22      [x]1-4 = Minimal depression   []5-9 = Milddepression   []10-14 = Moderate depression   []15-19 = Moderately severe depression   []20-27 = Severe depression    Discussed testing with the patient and all questions fully answered. Hospital Outpatient Visit on 09/17/2019   Component Date Value Ref Range Status    Sed Rate 09/17/2019 4  0 - 20 mm Final    ROSALINDA 09/17/2019 NEGATIVE  NEGATIVE Final    Comment: This test was run on the [employer] ROSALINDA test system. The system provides ten test   results (HEp-2NA, dsDNA, SSA, SSB, Sm, RNP, Scl-70, Tootie-1, Centromere and Histone analytes)   from a single patient sample. A negative ROSALINDA screen indicates that the specimen was   negative for all ten markers.       VARICELLA ZOSTER AB IGG 09/17/2019 1.93  >1.09 Final    Comment:    Interpretation:  IMMUNE     Reference Range:  <0.91         Not Immune  0.91-1.09     Equivocal  >1.09         Immune         Hemoglobin A1C 09/17/2019 5.9  4.0 - 6.0 % Final    Estimated Avg Glucose 09/17/2019 123  mg/dL Final    Comment: The ADA and AACC recommend providing the estimated average glucose result to permit better   patient understanding of their HBA1c result.  TSH 09/17/2019 1.68  0.30 - 5.00 mIU/L Final    Glucose 09/17/2019 122* 70 - 99 mg/dL Final    BUN 09/17/2019 9  6 - 20 mg/dL Final    CREATININE 09/17/2019 0.73  0.50 - 0.90 mg/dL Final    Bun/Cre Ratio 09/17/2019 NOT REPORTED  9 - 20 Final    Calcium 09/17/2019 9.2  8.6 - 10.4 mg/dL Final    Sodium 09/17/2019 139  135 - 144 mmol/L Final    Potassium 09/17/2019 4.0  3.7 - 5.3 mmol/L Final    Chloride 09/17/2019 101  98 - 107 mmol/L Final    CO2 09/17/2019 25  20 - 31 mmol/L Final    Anion Gap 09/17/2019 13  9 - 17 mmol/L Final    Alkaline Phosphatase 09/17/2019 49  35 - 104 U/L Final    ALT 09/17/2019 15  5 - 33 U/L Final    AST 09/17/2019 12  <32 U/L Final    Total Bilirubin 09/17/2019 0.30  0.3 - 1.2 mg/dL Final    Total Protein 09/17/2019 6.7  6.4 - 8.3 g/dL Final    Alb 09/17/2019 4.3  3.5 - 5.2 g/dL Final    Albumin/Globulin Ratio 09/17/2019 NOT REPORTED  1.0 - 2.5 Final    GFR Non- 09/17/2019 >60  >60 mL/min Final    GFR  09/17/2019 >60  >60 mL/min Final    GFR Comment 09/17/2019        Final    Comment: Average GFR for 30-36 years old:   80 mL/min/1.73sq m  Chronic Kidney Disease:   <60 mL/min/1.73sq m  Kidney failure:   <15 mL/min/1.73sq m              eGFR calculated using average adult body mass.  Additional eGFR calculator available at:        [email or web address]            GFR Staging 09/17/2019 NOT REPORTED   Final    WBC 09/17/2019 4.9  3.5 - 11.0 k/uL Final    RBC 09/17/2019 4.53  4.0 - 5.2 m/uL Final    Hemoglobin 09/17/2019 13.5  12.0 - 16.0 g/dL Final    Hematocrit 09/17/2019 39.0  36 - 46 % Final    MCV 09/17/2019 86.0  80 - 100 fL Final    MCH 09/17/2019 29.8  26 - 34 pg Final    MCHC 09/17/2019 34.6  31 - 37 g/dL Final    RDW 09/17/2019 12.8  11.5 - 14.9 % Final    Platelets 74/49/7702 200  150 - 450 k/uL Final    MPV 09/17/2019 7.7  6.0 - 12.0 fL Final    NRBC Automated 09/17/2019 NOT REPORTED  per 100 WBC Final    Cholesterol 09/17/2019 164  <200 mg/dL Final    Comment:    Cholesterol Guidelines:      <200  Desirable   200-240  Borderline      >240  Undesirable         HDL 09/17/2019 41  >40 mg/dL Final    Comment:    HDL Guidelines:    <40     Undesirable   40-59    Borderline    >59     Desirable         LDL Cholesterol 09/17/2019 96  0 - 130 mg/dL Final    Comment:    LDL Guidelines:     <100    Desirable   100-129   Near to/above Desirable   130-159   Borderline      >159   Undesirable     Direct (measured) LDL and calculated LDL are not interchangeable tests.  Chol/HDL Ratio 09/17/2019 4.0  <5 Final            Triglycerides 09/17/2019 135  <150 mg/dL Final    Comment:    Triglyceride Guidelines:     <150   Desirable   150-199  Borderline   200-499  High     >499   Very high   Based on AHA Guidelines for fasting triglyceride, October 2012.          VLDL 09/17/2019 NOT REPORTED  1 - 30 mg/dL Final         Most recent labs reviewed:     Lab Results   Component Value Date    WBC 4.9 09/17/2019    HGB 13.5 09/17/2019    HCT 39.0 09/17/2019    MCV 86.0 09/17/2019     09/17/2019       @BRIEFLAB(NA,K,CL,CO2,BUN,CREATININE,GLUCOSE,CALCIUM)@     Lab Results   Component Value Date    ALT 15 09/17/2019    AST 12 09/17/2019    ALKPHOS 49 09/17/2019    BILITOT 0.30 09/17/2019       Lab Results   Component Value Date    TSH 1.68 09/17/2019       Lab Results   Component Value Date    CHOL 164 09/17/2019    CHOL 166 04/05/2018    CHOL 158 09/02/2015 Lab Results   Component Value Date    TRIG 135 09/17/2019    TRIG 256 (H) 04/05/2018    TRIG 83 09/02/2015     Lab Results   Component Value Date    HDL 41 09/17/2019    HDL 41 04/05/2018    HDL 41 09/02/2015     Lab Results   Component Value Date    LDLCHOLESTEROL 96 09/17/2019    LDLCHOLESTEROL 74 04/05/2018    LDLCHOLESTEROL 100 09/02/2015     Lab Results   Component Value Date    VLDL NOT REPORTED 09/17/2019    VLDL NOT REPORTED 04/05/2018    VLDL NOT REPORTED 09/02/2015     Lab Results   Component Value Date    CHOLHDLRATIO 4.0 09/17/2019    CHOLHDLRATIO 4.0 04/05/2018    CHOLHDLRATIO 3.9 09/02/2015       Lab Results   Component Value Date    LABA1C 5.9 09/17/2019       No results found for: [id number]    No results found for: FOLATE    No results found for: IRON, TIBC, FERRITIN    No results found for: VITD25          Current Outpatient Medications   Medication Sig Dispense Refill    venlafaxine (EFFEXOR XR) 75 MG extended release capsule TAKE 1 CAPSULE BY MOUTH ONE TIME A DAY  30 capsule 3    pregabalin (LYRICA) 150 MG capsule TAKE 1 CAPSULE BY MOUTH TWO TIMES A DAY 60 capsule 0    meloxicam (MOBIC) 15 MG tablet TAKE 1 TABLET BY MOUTH ONE TIME A DAY AS NEEDED FOR BACK PAIN 30 tablet 0    venlafaxine (EFFEXOR XR) 150 MG extended release capsule TAKE 1 CAPSULE BY MOUTH ONE TIME A DAY ALONG WITH 75MG CAP. 90 capsule 1    tiZANidine (ZANAFLEX) 4 MG tablet TAKE 1 TABLET BY MOUTH ONE TIME A DAY AS NEEDED FOR LOW BACK PAIN.  30 tablet 0    traZODone (DESYREL) 100 MG tablet       lisinopril (PRINIVIL;ZESTRIL) 5 MG tablet TAKE 1 TABLET BY MOUTH ONE TIME A DAY 90 tablet 3    Elastic Bandages & Supports (WRIST BRACE/LEFT MEDIUM) MISC USE DAILY FOR HAND PAIN 1 each 0    Elastic Bandages & Supports (WRIST BRACE/RIGHT MEDIUM) MISC USE DAILY FOR HAND PAIN 1 each 0    risperiDONE (RISPERDAL) 2 MG tablet TAKE 1 TABLET BY MOUTH AT BEDTIME  2    amphetamine-dextroamphetamine (ADDERALL) 7.5 MG tablet TAKE 1 TABLET BY MOUTH TWO TIMES A DAY  0    Cholecalciferol (VITAMIN D3) 5000 units CAPS TAKE 1 CAPSULE BY MOUTH ONE TIME A DAY  1     No current facility-administered medications for this visit.               Social History     Socioeconomic History    Marital status:      Spouse name: Not on file    Number of children: Not on file    Years of education: Not on file    Highest education level: Not on file   Occupational History    Not on file   Social Needs    Financial resource strain: Not on file    Food insecurity     Worry: Not on file     Inability: Not on file    Transportation needs     Medical: Not on file     Non-medical: Not on file   Tobacco Use    Smoking status: Never Smoker    Smokeless tobacco: Never Used   Substance and Sexual Activity    Alcohol use: No     Alcohol/week: 0.0 standard drinks    Drug use: No    Sexual activity: Yes     Partners: Male   Lifestyle    Physical activity     Days per week: Not on file     Minutes per session: Not on file    Stress: Not on file   Relationships    Social connections     Talks on phone: Not on file     Gets together: Not on file     Attends [employer] service: Not on file     Active member of club or organization: Not on file     Attends meetings of clubs or organizations: Not on file     Relationship status: Not on file    Intimate partner violence     Fear of current or ex partner: Not on file     Emotionally abused: Not on file     Physically abused: Not on file     Forced sexual activity: Not on file   Other Topics Concern    Not on file   Social History Narrative    Not on file     Counseling given: Not Answered        Family History   Problem Relation Age of Onset    Arthritis Mother     Depression Mother     High Blood Pressure Mother     Colon Cancer Mother     High Blood Pressure Father     Cervical Cancer Maternal Grandmother     Cancer Paternal Grandmother         melanoma bladder kidney    Cancer Paternal Grandfather melanoma             -rest of complaints with corresponding details per ROS    The patient's past medical, surgical, social, and family history as well as her current medications and allergies were reviewed as documented intoday's encounter. Review of Systems   Constitutional: Negative for activity change, appetite change, diaphoresis, fatigue and unexpected weight change. HENT: Negative for congestion, hearing loss, postnasal drip, rhinorrhea and sinus pressure. Eyes: Negative for photophobia and visual disturbance. Respiratory: Negative for cough, shortness of breath and wheezing. Cardiovascular: Negative for chest pain, palpitations and leg swelling. Gastrointestinal: Negative for abdominal distention, abdominal pain, constipation and diarrhea. Endocrine: Negative for polyuria. Genitourinary: Negative for difficulty urinating, flank pain, frequency, urgency and vaginal pain. Musculoskeletal: Positive for arthralgias, back pain, gait problem, myalgias and neck pain. Neurological: Positive for weakness and numbness. Negative for dizziness, speech difficulty, light-headedness and headaches. Psychiatric/Behavioral: Negative for agitation, decreased concentration, hallucinations and sleep disturbance. The patient is not nervous/anxious. Physical Exam    PHYSICAL EXAM:   VITALS:   Vitals:    08/18/20 1141   BP: 118/82   Pulse: 104   Temp: 97.4 °F (36.3 °C)   SpO2: 98%     GENERAL:  Patient is a well-developed, well-nourished female  in no acute distress, alert and oriented x3, appropriate and pleasant conversation. HEAD: Normocephalic, atraumatic. EYES: Pupils equal, round and reactive to light and accommodation, extraocular   movements intact. ENT: Moist mucous membranes. No erythema is noted. NECK: Supple. No masses. No lymphadenopathy. CARDIOVASCULAR: Regular rate and rhythm. PULMONARY: Lungs are clear to auscultation bilaterally.    ABDOMEN: Soft, nontender, nondistended. Positive bowel sounds. MUSCULOSKELETAL: Multiple tender spots on her back and shoulders. NEUROLOGIC: Cranial nerves II through XII grossly intact. No focal deficits are noted. ASSESSMENT AND PLAN      1. Fibromyalgia  Increase Lyrica to 150 mg continue muscle relaxant as needed  - CBC; Future  - Comprehensive Metabolic Panel; Future  - Hemoglobin A1C; Future  - pregabalin (LYRICA) 150 MG capsule; TAKE 1 CAPSULE BY MOUTH TWO TIMES A DAY  Dispense: 60 capsule; Refill: 0    2. Prediabetes  A1c stable continue diet control  - CBC; Future  - Comprehensive Metabolic Panel; Future  - Hemoglobin A1C; Future    3. Chronic midline low back pain with right-sided sciatica  Increase Lyrica, medication contract signed  - pregabalin (LYRICA) 150 MG capsule; TAKE 1 CAPSULE BY MOUTH TWO TIMES A DAY  Dispense: 60 capsule; Refill: 0    4. Essential hypertension  Controlled continue same medications  - CBC; Future  - Comprehensive Metabolic Panel; Future  - TSH without Reflex; Future  - Vitamin D 25 Hydroxy; Future    5. Major depressive disorder, recurrent, severe without psychotic features ([address]  Stable on current treatment    6. Pain of both hip joints    - pregabalin (LYRICA) 150 MG capsule; TAKE 1 CAPSULE BY MOUTH TWO TIMES A DAY  Dispense: 60 capsule;  Refill: 0      Orders Placed This Encounter   Procedures    CBC     Standing Status:   Future     Standing Expiration Date:   8/18/2021    Comprehensive Metabolic Panel     Fasting 8 hrs     Standing Status:   Future     Standing Expiration Date:   8/18/2021    Hemoglobin A1C     Standing Status:   Future     Standing Expiration Date:   8/18/2021    TSH without Reflex     Standing Status:   Future     Standing Expiration Date:   8/18/2021    Vitamin D 25 Hydroxy     Standing Status:   Future     Standing Expiration Date:   8/18/2021         Medications Discontinued During This Encounter   Medication Reason    pregabalin (LYRICA) 100 MG capsule REORDER Chaitanya Gold received counseling on the following healthy behaviors: nutrition, exercise, medication adherence and tobacco cessation  Reviewed prior labs and health maintenance  Continue current medications, diet and exercise. Discussed use, benefit, and side effects of prescribed medications. Barriers to medication compliance addressed. Patient given educational materials - see patient instructions  Was a self-tracking handout given in paper form or via [employer]? Yes    Requested Prescriptions     Signed Prescriptions Disp Refills    pregabalin (LYRICA) 150 MG capsule 60 capsule 0     Sig: TAKE 1 CAPSULE BY MOUTH TWO TIMES A DAY       All patient questions answered. Patient voiced understanding. Quality Measures    Body mass index is 33.96 kg/m². Elevated. Weight control planned discussed Healthy diet and regular exercise. BP: 118/82 Blood pressure is normal. Treatment plan consists of No treatment change needed. Lab Results   Component Value Date    LDLCHOLESTEROL 96 09/17/2019    (goal LDL reduction with dx if diabetes is 50% LDL reduction)      PHQ Scores 8/18/2020 9/10/2019 10/29/2018 1/31/2018 11/1/2017 5/24/2017 4/20/2017   PHQ2 Score 2 1 6 5 3 4 6   PHQ9 Score 2 1 13 22 3 16 22     Interpretation of Total Score Depression Severity: 1-4 = Minimal depression, 5-9 = Mild depression, 10-14 = Moderate depression, 15-19 = Moderately severe depression, 20-27 = Severe depression    The patient'spast medical, surgical, social, and family history as well as her   current medications and allergies were reviewed as documented in today's encounter. Medications, labs, diagnostic studies, consultations andfollow-up as documented in this encounter. Return in about 4 months (around 12/18/2020) for rapid dalia diamond , lab work , htn , fibromyalgia . Patient wasseen with total face to face time of 25 minutes. More than 50% of this visit was counseling and education.        Future Appointments   Date Time Provider Peggy Tineo   12/22/2020  9:00 AM Julio Suarez MD [email or web address]     This note was completed by using the assistance of a speech-recognition program. However, inadvertent computerized transcription errors may be present. Althoughevery effort was made to ensure accuracy, no guarantees can be provided that every mistake has been identified and corrected by editing.   Electronically signed by Julio Suarez MD on 8/18/2020  12:42 PM

## 2020-08-18 NOTE — PROGRESS NOTES
Chronic Disease Visit Information    BP Readings from Last 3 Encounters:   05/19/20 128/73   02/06/20 120/80   10/08/19 119/81          Hemoglobin A1C (%)   Date Value   09/17/2019 5.9   09/17/2018 5.8   04/05/2018 5.8     LDL Cholesterol (mg/dL)   Date Value   09/17/2019 96     HDL (mg/dL)   Date Value   09/17/2019 41     BUN (mg/dL)   Date Value   09/17/2019 9     CREATININE (mg/dL)   Date Value   09/17/2019 0.73     Glucose (mg/dL)   Date Value   09/17/2019 122 (H)            Have you changed or started any medications since your last visit including any over-the-counter medicines, vitamins, or herbal medicines? no   Are you having any side effects from any of your medications? -  no  Have you stopped taking any of your medications? Is so, why? -  no    Have you seen any other physician or provider since your last visit? No  Have you had any other diagnostic tests since your last visit? No  Have you been seen in the emergency room and/or had an admission to a hospital since we last saw you? No  Have you had your annual diabetic retinal (eye) exam? No  Have you had your routine dental cleaning in the past 6 months? yes -     Have you activated your [employer] account? If not, what are your barriers?  Yes     Patient Care Team:  Fannie Lea MD as PCP - General (Family Medicine)  Fannie Lea MD as PCP - [facility] Provider         Medical History Review  Past Medical, Family, and Social History reviewed and does contribute to the patient presenting condition    Health Maintenance   Topic Date Due    Flu vaccine (1) 09/01/2020    A1C test (Diabetic or Prediabetic)  09/17/2020    Potassium monitoring  09/17/2020    Creatinine monitoring  09/17/2020    Cervical cancer screen  11/11/2020    DTaP/Tdap/Td vaccine (2 - Td) 04/03/2028    HIV screen  Completed    Hepatitis A vaccine  Aged Out    Hepatitis B vaccine  Aged Out    Hib vaccine  Aged Out    Meningococcal (ACWY) vaccine  Aged Out    Pneumococcal 0-64 years Vaccine  Aged Out    Varicella vaccine  Discontinued

## 2020-08-25 RX ORDER — MELOXICAM 15 MG/1
TABLET ORAL
Qty: 30 TABLET | Refills: 0 | Status: SHIPPED | OUTPATIENT
Start: 2020-08-25 | End: 2021-03-17 | Stop reason: SDUPTHER

## 2020-09-22 RX ORDER — PREGABALIN 150 MG/1
CAPSULE ORAL
Qty: 60 CAPSULE | Refills: 0 | Status: SHIPPED | OUTPATIENT
Start: 2020-09-22 | End: 2020-10-16

## 2020-10-16 RX ORDER — PREGABALIN 150 MG/1
CAPSULE ORAL
Qty: 60 CAPSULE | Refills: 0 | Status: SHIPPED | OUTPATIENT
Start: 2020-10-16 | End: 2020-12-07

## 2020-10-16 NOTE — TELEPHONE ENCOUNTER
Please Approve or Refuse.   Send to Pharmacy per Pt's Request:      Next Visit Date:  12/22/2020   Last Visit Date: 8/18/2020    Hemoglobin A1C (%)   Date Value   09/17/2019 5.9   09/17/2018 5.8   04/05/2018 5.8             ( goal A1C is < 7)   BP Readings from Last 3 Encounters:   08/18/20 118/82   05/19/20 128/73   02/06/20 120/80          (goal 120/80)  BUN   Date Value Ref Range Status   09/17/2019 9 6 - 20 mg/dL Final     CREATININE   Date Value Ref Range Status   09/17/2019 0.73 0.50 - 0.90 mg/dL Final     Potassium   Date Value Ref Range Status   09/17/2019 4.0 3.7 - 5.3 mmol/L Final

## 2020-10-20 RX ORDER — VENLAFAXINE HYDROCHLORIDE 150 MG/1
CAPSULE, EXTENDED RELEASE ORAL
Qty: 90 CAPSULE | Refills: 3 | Status: SHIPPED | OUTPATIENT
Start: 2020-10-20 | End: 2021-11-15

## 2020-12-07 RX ORDER — PREGABALIN 150 MG/1
CAPSULE ORAL
Qty: 60 CAPSULE | Refills: 0 | Status: SHIPPED | OUTPATIENT
Start: 2020-12-07 | End: 2020-12-28

## 2020-12-28 RX ORDER — PREGABALIN 150 MG/1
CAPSULE ORAL
Qty: 60 CAPSULE | Refills: 0 | OUTPATIENT
Start: 2020-12-28

## 2020-12-28 RX ORDER — VENLAFAXINE HYDROCHLORIDE 75 MG/1
CAPSULE, EXTENDED RELEASE ORAL
Qty: 30 CAPSULE | Refills: 0 | Status: SHIPPED | OUTPATIENT
Start: 2020-12-28 | End: 2021-02-03 | Stop reason: SDUPTHER

## 2020-12-28 RX ORDER — PREGABALIN 150 MG/1
CAPSULE ORAL
Qty: 60 CAPSULE | Refills: 0 | Status: SHIPPED | OUTPATIENT
Start: 2020-12-28 | End: 2021-02-12

## 2021-02-03 DIAGNOSIS — F33.41 RECURRENT MAJOR DEPRESSIVE DISORDER, IN PARTIAL REMISSION (HCC): ICD-10-CM

## 2021-02-03 RX ORDER — VENLAFAXINE HYDROCHLORIDE 75 MG/1
CAPSULE, EXTENDED RELEASE ORAL
Qty: 30 CAPSULE | Refills: 0 | Status: SHIPPED | OUTPATIENT
Start: 2021-02-03 | End: 2021-03-05

## 2021-02-12 DIAGNOSIS — M25.552 PAIN OF BOTH HIP JOINTS: ICD-10-CM

## 2021-02-12 DIAGNOSIS — M25.551 PAIN OF BOTH HIP JOINTS: ICD-10-CM

## 2021-02-12 DIAGNOSIS — M79.7 FIBROMYALGIA: ICD-10-CM

## 2021-02-12 DIAGNOSIS — G89.29 CHRONIC MIDLINE LOW BACK PAIN WITH RIGHT-SIDED SCIATICA: ICD-10-CM

## 2021-02-12 DIAGNOSIS — M54.41 CHRONIC MIDLINE LOW BACK PAIN WITH RIGHT-SIDED SCIATICA: ICD-10-CM

## 2021-02-12 RX ORDER — PREGABALIN 150 MG/1
CAPSULE ORAL
Qty: 60 CAPSULE | Refills: 0 | Status: SHIPPED | OUTPATIENT
Start: 2021-02-12 | End: 2021-03-17 | Stop reason: SDUPTHER

## 2021-02-12 NOTE — TELEPHONE ENCOUNTER
Please Approve or Refuse.   Send to Pharmacy per Pt's Request:      Next Visit Date:  Visit date not found   Last Visit Date: 12/22/2020    Hemoglobin A1C (%)   Date Value   09/17/2019 5.9   09/17/2018 5.8   04/05/2018 5.8             ( goal A1C is < 7)   BP Readings from Last 3 Encounters:   08/18/20 118/82   05/19/20 128/73   02/06/20 120/80          (goal 120/80)  BUN   Date Value Ref Range Status   09/17/2019 9 6 - 20 mg/dL Final     CREATININE   Date Value Ref Range Status   09/17/2019 0.73 0.50 - 0.90 mg/dL Final     Potassium   Date Value Ref Range Status   09/17/2019 4.0 3.7 - 5.3 mmol/L Final

## 2021-03-02 DIAGNOSIS — I10 ESSENTIAL HYPERTENSION: ICD-10-CM

## 2021-03-02 RX ORDER — LISINOPRIL 5 MG/1
TABLET ORAL
Qty: 90 TABLET | Refills: 0 | Status: SHIPPED | OUTPATIENT
Start: 2021-03-02 | End: 2021-03-17 | Stop reason: SDUPTHER

## 2021-03-05 DIAGNOSIS — F33.41 RECURRENT MAJOR DEPRESSIVE DISORDER, IN PARTIAL REMISSION (HCC): ICD-10-CM

## 2021-03-05 RX ORDER — VENLAFAXINE HYDROCHLORIDE 75 MG/1
CAPSULE, EXTENDED RELEASE ORAL
Qty: 30 CAPSULE | Refills: 0 | Status: SHIPPED | OUTPATIENT
Start: 2021-03-05 | End: 2021-03-09

## 2021-03-05 RX ORDER — VENLAFAXINE HYDROCHLORIDE 75 MG/1
CAPSULE, EXTENDED RELEASE ORAL
Qty: 30 CAPSULE | Refills: 0 | Status: CANCELLED | OUTPATIENT
Start: 2021-03-05

## 2021-03-09 DIAGNOSIS — F33.41 RECURRENT MAJOR DEPRESSIVE DISORDER, IN PARTIAL REMISSION (HCC): ICD-10-CM

## 2021-03-09 RX ORDER — VENLAFAXINE HYDROCHLORIDE 75 MG/1
CAPSULE, EXTENDED RELEASE ORAL
Qty: 30 CAPSULE | Refills: 0 | Status: SHIPPED | OUTPATIENT
Start: 2021-03-09 | End: 2021-06-09

## 2021-03-17 ENCOUNTER — OFFICE VISIT (OUTPATIENT)
Dept: FAMILY MEDICINE CLINIC | Age: 41
End: 2021-03-17
Payer: COMMERCIAL

## 2021-03-17 VITALS
WEIGHT: 210 LBS | HEART RATE: 100 BPM | OXYGEN SATURATION: 97 % | TEMPERATURE: 98.5 F | SYSTOLIC BLOOD PRESSURE: 136 MMHG | HEIGHT: 66 IN | BODY MASS INDEX: 33.75 KG/M2 | DIASTOLIC BLOOD PRESSURE: 100 MMHG

## 2021-03-17 DIAGNOSIS — F32.A ANXIETY AND DEPRESSION: ICD-10-CM

## 2021-03-17 DIAGNOSIS — M54.41 CHRONIC MIDLINE LOW BACK PAIN WITH RIGHT-SIDED SCIATICA: ICD-10-CM

## 2021-03-17 DIAGNOSIS — B96.89 ACUTE BACTERIAL SINUSITIS: ICD-10-CM

## 2021-03-17 DIAGNOSIS — G89.29 CHRONIC MIDLINE LOW BACK PAIN WITH RIGHT-SIDED SCIATICA: ICD-10-CM

## 2021-03-17 DIAGNOSIS — Z51.81 MEDICATION MONITORING ENCOUNTER: ICD-10-CM

## 2021-03-17 DIAGNOSIS — M79.7 FIBROMYALGIA: ICD-10-CM

## 2021-03-17 DIAGNOSIS — J01.90 ACUTE BACTERIAL SINUSITIS: ICD-10-CM

## 2021-03-17 DIAGNOSIS — M25.551 PAIN OF BOTH HIP JOINTS: ICD-10-CM

## 2021-03-17 DIAGNOSIS — F33.2 MAJOR DEPRESSIVE DISORDER, RECURRENT, SEVERE WITHOUT PSYCHOTIC FEATURES (HCC): ICD-10-CM

## 2021-03-17 DIAGNOSIS — F41.9 ANXIETY AND DEPRESSION: ICD-10-CM

## 2021-03-17 DIAGNOSIS — M25.552 PAIN OF BOTH HIP JOINTS: ICD-10-CM

## 2021-03-17 DIAGNOSIS — E66.09 CLASS 1 OBESITY DUE TO EXCESS CALORIES WITHOUT SERIOUS COMORBIDITY WITH BODY MASS INDEX (BMI) OF 33.0 TO 33.9 IN ADULT: ICD-10-CM

## 2021-03-17 DIAGNOSIS — Z00.00 PREVENTATIVE HEALTH CARE: ICD-10-CM

## 2021-03-17 DIAGNOSIS — I10 ESSENTIAL HYPERTENSION: Primary | ICD-10-CM

## 2021-03-17 DIAGNOSIS — R73.03 PREDIABETES: ICD-10-CM

## 2021-03-17 LAB
ALCOHOL URINE: NORMAL
AMPHETAMINE SCREEN, URINE: NEGATIVE
BARBITURATE SCREEN, URINE: NEGATIVE
BENZODIAZEPINE SCREEN, URINE: NEGATIVE
BUPRENORPHINE URINE: NORMAL
COCAINE METABOLITE SCREEN URINE: NEGATIVE
FENTANYL SCREEN, URINE: NORMAL
GABAPENTIN SCREEN, URINE: NORMAL
MDMA URINE: NEGATIVE
METHADONE SCREEN, URINE: NEGATIVE
METHAMPHETAMINE, URINE: NEGATIVE
OPIATE SCREEN URINE: NEGATIVE
OXYCODONE SCREEN URINE: NEGATIVE
PHENCYCLIDINE SCREEN URINE: NEGATIVE
PROPOXYPHENE SCREEN, URINE: NORMAL
SYNTHETIC CANNABINOIDS(K2) SCREEN, URINE: NORMAL
THC SCREEN, URINE: POSITIVE
TRAMADOL SCREEN URINE: NORMAL
TRICYCLIC ANTIDEPRESSANTS, UR: NEGATIVE

## 2021-03-17 PROCEDURE — 99214 OFFICE O/P EST MOD 30 MIN: CPT | Performed by: FAMILY MEDICINE

## 2021-03-17 PROCEDURE — 80305 DRUG TEST PRSMV DIR OPT OBS: CPT | Performed by: FAMILY MEDICINE

## 2021-03-17 RX ORDER — AZITHROMYCIN 250 MG/1
TABLET, FILM COATED ORAL
Qty: 6 TABLET | Refills: 0 | Status: SHIPPED | OUTPATIENT
Start: 2021-03-17 | End: 2021-03-22

## 2021-03-17 RX ORDER — MELOXICAM 15 MG/1
TABLET ORAL
Qty: 60 TABLET | Refills: 1 | Status: SHIPPED | OUTPATIENT
Start: 2021-03-17 | End: 2021-07-06

## 2021-03-17 RX ORDER — PREGABALIN 150 MG/1
CAPSULE ORAL
Qty: 60 CAPSULE | Refills: 0 | Status: SHIPPED | OUTPATIENT
Start: 2021-03-17 | End: 2021-05-06

## 2021-03-17 RX ORDER — FLUTICASONE PROPIONATE 50 MCG
2 SPRAY, SUSPENSION (ML) NASAL DAILY
Qty: 1 BOTTLE | Refills: 0 | Status: SHIPPED | OUTPATIENT
Start: 2021-03-17 | End: 2021-05-06

## 2021-03-17 RX ORDER — LISINOPRIL 10 MG/1
10 TABLET ORAL DAILY
Qty: 90 TABLET | Refills: 1 | Status: SHIPPED | OUTPATIENT
Start: 2021-03-17 | End: 2021-09-07

## 2021-03-17 ASSESSMENT — ENCOUNTER SYMPTOMS
FACIAL SWELLING: 0
SINUS PAIN: 1
BLOOD IN STOOL: 0
COLOR CHANGE: 0
SHORTNESS OF BREATH: 0
SORE THROAT: 1
CONSTIPATION: 0
NAUSEA: 0
BACK PAIN: 1
ABDOMINAL DISTENTION: 0
SINUS PRESSURE: 1
COUGH: 1
PHOTOPHOBIA: 0
VOMITING: 0
WHEEZING: 0
DIARRHEA: 0
RHINORRHEA: 1
CHEST TIGHTNESS: 0
TROUBLE SWALLOWING: 0
ABDOMINAL PAIN: 0

## 2021-03-17 ASSESSMENT — PATIENT HEALTH QUESTIONNAIRE - PHQ9
2. FEELING DOWN, DEPRESSED OR HOPELESS: 1
SUM OF ALL RESPONSES TO PHQ QUESTIONS 1-9: 1
1. LITTLE INTEREST OR PLEASURE IN DOING THINGS: 0
SUM OF ALL RESPONSES TO PHQ QUESTIONS 1-9: 1
SUM OF ALL RESPONSES TO PHQ QUESTIONS 1-9: 1

## 2021-03-17 NOTE — PROGRESS NOTES
Chief Complaint   Patient presents with    Hypertension    Pharyngitis    Congestion         Molly Octavio English  here today for follow up on chronic medical problems, go over labs and/or diagnostic studies, and medication refills. Hypertension, Pharyngitis, and Congestion      HPI: Patient is here for follow-up on hypertension, fibromyalgia and other chronic medical problems. Blood pressure is running too high, reports its normal at home she is on lisinopril 5 mg. Patient shortness of breath, chest pain palpitations. Patient is sick complaining of sinus congestion sore throat, headache. Patient gets checked for Covid 2 times at work and was tested yesterday which was negative. She did not try any over-the-counter medications    She has not done blood work which was ordered on previous appointment. Labs reprinted. Prediabetes on diet control. Depression stable on current treatment, denies any recent flareups. Patient still goes for counseling. Patient also has chronic lumbar degenerative disease and is on muscle relaxant and NSAIDs as needed. Pain is controllable. Obesity weight is stable she has not gained any weight on Lyrica, reports she  watches her diet and is physically active. Patient is due for urine drug screen. BP (!) 136/100   Pulse 100   Temp 98.5 °F (36.9 °C) (Temporal)   Ht 5' 6\" (1.676 m)   Wt 210 lb (95.3 kg)   LMP 02/14/2021 (Exact Date)   SpO2 97%   BMI 33.89 kg/m²    Body mass index is 33.89 kg/m². Wt Readings from Last 3 Encounters:   03/17/21 210 lb (95.3 kg)   08/18/20 210 lb 6.4 oz (95.4 kg)   05/19/20 214 lb (97.1 kg)        []Negative depression screening.   PHQ Scores 3/17/2021 8/18/2020 9/10/2019 10/29/2018 1/31/2018 11/1/2017 5/24/2017   PHQ2 Score 1 2 1 6 5 3 4   PHQ9 Score 1 2 1 13 22 3 16      [x]1-4 = Minimal depression   []5-9 = Milddepression   []10-14 = Moderate depression   []15-19 = Moderately severe depression   []20-27 = Severe depression    Discussed testing with the patient and all questions fully answered. Hospital Outpatient Visit on 09/17/2019   Component Date Value Ref Range Status    Sed Rate 09/17/2019 4  0 - 20 mm Final    ROSALINDA 09/17/2019 NEGATIVE  NEGATIVE Final    Comment: This test was run on the [employer] ROSALINDA test system. The system provides ten test   results (HEp-2NA, dsDNA, SSA, SSB, Sm, RNP, Scl-70, Tootie-1, Centromere and Histone analytes)   from a single patient sample. A negative ROSALINDA screen indicates that the specimen was   negative for all ten markers.  VARICELLA ZOSTER AB IGG 09/17/2019 1.93  >1.09 Final    Comment:    Interpretation:  IMMUNE     Reference Range:  <0.91         Not Immune  0.91-1.09     Equivocal  >1.09         Immune         Hemoglobin A1C 09/17/2019 5.9  4.0 - 6.0 % Final    Estimated Avg Glucose 09/17/2019 123  mg/dL Final    Comment: The ADA and AACC recommend providing the estimated average glucose result to permit better   patient understanding of their HBA1c result.       TSH 09/17/2019 1.68  0.30 - 5.00 mIU/L Final    Glucose 09/17/2019 122* 70 - 99 mg/dL Final    BUN 09/17/2019 9  6 - 20 mg/dL Final    CREATININE 09/17/2019 0.73  0.50 - 0.90 mg/dL Final    Bun/Cre Ratio 09/17/2019 NOT REPORTED  9 - 20 Final    Calcium 09/17/2019 9.2  8.6 - 10.4 mg/dL Final    Sodium 09/17/2019 139  135 - 144 mmol/L Final    Potassium 09/17/2019 4.0  3.7 - 5.3 mmol/L Final    Chloride 09/17/2019 101  98 - 107 mmol/L Final    CO2 09/17/2019 25  20 - 31 mmol/L Final    Anion Gap 09/17/2019 13  9 - 17 mmol/L Final    Alkaline Phosphatase 09/17/2019 49  35 - 104 U/L Final    ALT 09/17/2019 15  5 - 33 U/L Final    AST 09/17/2019 12  <32 U/L Final    Total Bilirubin 09/17/2019 0.30  0.3 - 1.2 mg/dL Final    Total Protein 09/17/2019 6.7  6.4 - 8.3 g/dL Final    Albumin 09/17/2019 4.3  3.5 - 5.2 g/dL Final    Albumin/Globulin Ratio 09/17/2019 NOT REPORTED  1.0 - 2.5 Final    GFR Non- 09/17/2019 >60  >60 mL/min Final    GFR  09/17/2019 >60  >60 mL/min Final    GFR Comment 09/17/2019        Final    Comment: Average GFR for 30-36 years old:   107 mL/min/1.73sq m  Chronic Kidney Disease:   <60 mL/min/1.73sq m  Kidney failure:   <15 mL/min/1.73sq m              eGFR calculated using average adult body mass. Additional eGFR calculator available at:        [employer]            GFR Staging 09/17/2019 NOT REPORTED   Final    WBC 09/17/2019 4.9  3.5 - 11.0 k/uL Final    RBC 09/17/2019 4.53  4.0 - 5.2 m/uL Final    Hemoglobin 09/17/2019 13.5  12.0 - 16.0 g/dL Final    Hematocrit 09/17/2019 39.0  36 - 46 % Final    MCV 09/17/2019 86.0  80 - 100 fL Final    MCH 09/17/2019 29.8  26 - 34 pg Final    MCHC 09/17/2019 34.6  31 - 37 g/dL Final    RDW 09/17/2019 12.8  11.5 - 14.9 % Final    Platelets 89/21/3076 200  150 - 450 k/uL Final    MPV 09/17/2019 7.7  6.0 - 12.0 fL Final    NRBC Automated 09/17/2019 NOT REPORTED  per 100 WBC Final    Cholesterol 09/17/2019 164  <200 mg/dL Final    Comment:    Cholesterol Guidelines:      <200  Desirable   200-240  Borderline      >240  Undesirable         HDL 09/17/2019 41  >40 mg/dL Final    Comment:    HDL Guidelines:    <40     Undesirable   40-59    Borderline    >59     Desirable         LDL Cholesterol 09/17/2019 96  0 - 130 mg/dL Final    Comment:    LDL Guidelines:     <100    Desirable   100-129   Near to/above Desirable   130-159   Borderline      >159   Undesirable     Direct (measured) LDL and calculated LDL are not interchangeable tests.  Chol/HDL Ratio 09/17/2019 4.0  <5 Final            Triglycerides 09/17/2019 135  <150 mg/dL Final    Comment:    Triglyceride Guidelines:     <150   Desirable   150-199  Borderline   200-499  High     >499   Very high   Based on AHA Guidelines for fasting triglyceride, October 2012.          VLDL 09/17/2019 NOT REPORTED  1 - 30 mg/dL Final         Most recent labs reviewed:     Lab Results   Component Value Date    WBC 4.9 09/17/2019    HGB 13.5 09/17/2019    HCT 39.0 09/17/2019    MCV 86.0 09/17/2019     09/17/2019       @BRIEFLAB(NA,K,CL,CO2,BUN,CREATININE,GLUCOSE,CALCIUM)@     Lab Results   Component Value Date    ALT 15 09/17/2019    AST 12 09/17/2019    ALKPHOS 49 09/17/2019    BILITOT 0.30 09/17/2019       Lab Results   Component Value Date    TSH 1.68 09/17/2019       Lab Results   Component Value Date    CHOL 164 09/17/2019    CHOL 166 04/05/2018    CHOL 158 09/02/2015     Lab Results   Component Value Date    TRIG 135 09/17/2019    TRIG 256 (H) 04/05/2018    TRIG 83 09/02/2015     Lab Results   Component Value Date    HDL 41 09/17/2019    HDL 41 04/05/2018    HDL 41 09/02/2015     Lab Results   Component Value Date    LDLCHOLESTEROL 96 09/17/2019    LDLCHOLESTEROL 74 04/05/2018    LDLCHOLESTEROL 100 09/02/2015     Lab Results   Component Value Date    VLDL NOT REPORTED 09/17/2019    VLDL NOT REPORTED 04/05/2018    VLDL NOT REPORTED 09/02/2015     Lab Results   Component Value Date    CHOLHDLRATIO 4.0 09/17/2019    CHOLHDLRATIO 4.0 04/05/2018    CHOLHDLRATIO 3.9 09/02/2015       Lab Results   Component Value Date    LABA1C 5.9 09/17/2019       No results found for: LCNQTUCY47    No results found for: FOLATE    No results found for: IRON, TIBC, FERRITIN    No results found for: VITD25          Current Outpatient Medications   Medication Sig Dispense Refill    pregabalin (LYRICA) 150 MG capsule TAKE 1 CAPSULE BY MOUTH TWO TIMES A DAY 60 capsule 0    meloxicam (MOBIC) 15 MG tablet TAKE 1 TABLET BY MOUTH ONE TIME A DAY AS NEEDED for back pain 60 tablet 1    azithromycin (ZITHROMAX) 250 MG tablet 500 mg orally on day one followed by 250 mg daily on days two through five 6 tablet 0    fluticasone (FLONASE) 50 MCG/ACT nasal spray 2 sprays by Nasal route daily 1 Bottle 0    guaiFENesin (MUCINEX) 600 MG extended release tablet Take 1 tablet by mouth 2 times daily 30 tablet 0    lisinopril (PRINIVIL;ZESTRIL) 10 MG tablet Take 1 tablet by mouth daily 90 tablet 1    venlafaxine (EFFEXOR XR) 75 MG extended release capsule TAKE 1 CAPSULE BY MOUTH EVERY DAY 30 capsule 0    venlafaxine (EFFEXOR XR) 150 MG extended release capsule TAKE 1 CAPSULE BY MOUTH ONE TIME A DAY ALONG WITH 75 MG CAPSULE 90 capsule 3    tiZANidine (ZANAFLEX) 4 MG tablet TAKE 1 TABLET BY MOUTH ONE TIME A DAY AS NEEDED FOR LOW BACK PAIN. 30 tablet 0    traZODone (DESYREL) 100 MG tablet       Elastic Bandages & Supports (WRIST BRACE/LEFT MEDIUM) MISC USE DAILY FOR HAND PAIN 1 each 0    Elastic Bandages & Supports (WRIST BRACE/RIGHT MEDIUM) MISC USE DAILY FOR HAND PAIN 1 each 0    risperiDONE (RISPERDAL) 2 MG tablet TAKE 1 TABLET BY MOUTH AT BEDTIME  2    amphetamine-dextroamphetamine (ADDERALL) 7.5 MG tablet TAKE 1 TABLET BY MOUTH TWO TIMES A DAY  0    Cholecalciferol (VITAMIN D3) 5000 units CAPS TAKE 1 CAPSULE BY MOUTH ONE TIME A DAY  1     No current facility-administered medications for this visit.               Social History     Socioeconomic History    Marital status:      Spouse name: Not on file    Number of children: Not on file    Years of education: Not on file    Highest education level: Not on file   Occupational History    Not on file   Social Needs    Financial resource strain: Not on file    Food insecurity     Worry: Not on file     Inability: Not on file    Transportation needs     Medical: Not on file     Non-medical: Not on file   Tobacco Use    Smoking status: Never Smoker    Smokeless tobacco: Never Used   Substance and Sexual Activity    Alcohol use: No     Alcohol/week: 0.0 standard drinks    Drug use: No    Sexual activity: Yes     Partners: Male   Lifestyle    Physical activity     Days per week: Not on file     Minutes per session: Not on file    Stress: Not on file   Relationships    Social connections     Talks on phone: Not on file     Gets together: Not on file     Attends Nondenominational service: Not on file     Active member of club or organization: Not on file     Attends meetings of clubs or organizations: Not on file     Relationship status: Not on file    Intimate partner violence     Fear of current or ex partner: Not on file     Emotionally abused: Not on file     Physically abused: Not on file     Forced sexual activity: Not on file   Other Topics Concern    Not on file   Social History Narrative    Not on file     Counseling given: Yes        Family History   Problem Relation Age of Onset    Arthritis Mother     Depression Mother     High Blood Pressure Mother     Colon Cancer Mother     High Blood Pressure Father     Cervical Cancer Maternal Grandmother     Cancer Paternal Grandmother         melanoma bladder kidney    Cancer Paternal Grandfather         melanoma             -rest of complaints with corresponding details per ROS    The patient's past medical, surgical, social, and family history as well as her current medications and allergies were reviewed as documented intoday's encounter. Review of Systems   Constitutional: Positive for activity change and appetite change. Negative for chills, fatigue, fever and unexpected weight change. HENT: Positive for congestion, postnasal drip, rhinorrhea, sinus pressure, sinus pain and sore throat. Negative for facial swelling, hearing loss, sneezing and trouble swallowing. Eyes: Negative for photophobia and visual disturbance. Respiratory: Positive for cough. Negative for chest tightness, shortness of breath and wheezing. Cardiovascular: Negative for chest pain, palpitations and leg swelling. Gastrointestinal: Negative for abdominal distention, abdominal pain, blood in stool, constipation, diarrhea, nausea and vomiting. Endocrine: Negative for polyphagia and polyuria.    Genitourinary: Negative for difficulty urinating, flank pain, frequency, menstrual problem, urgency and vaginal pain. Musculoskeletal: Positive for arthralgias, back pain and myalgias. Negative for gait problem and neck stiffness. Skin: Negative for color change and rash. Allergic/Immunologic: Negative for immunocompromised state. Neurological: Negative for dizziness, tremors, speech difficulty, weakness, light-headedness, numbness and headaches. Psychiatric/Behavioral: Negative for agitation, confusion, dysphoric mood, hallucinations, self-injury, sleep disturbance and suicidal ideas. The patient is nervous/anxious. Physical Exam  Vitals signs and nursing note reviewed. Constitutional:       Appearance: Normal appearance. She is obese. HENT:      Head: Normocephalic. Right Ear: Tympanic membrane normal.      Nose: Mucosal edema, congestion and rhinorrhea present. No septal deviation. Right Nostril: No foreign body. Left Nostril: No foreign body. Right Turbinates: Not enlarged. Left Turbinates: Not enlarged. Right Sinus: Maxillary sinus tenderness and frontal sinus tenderness present. Left Sinus: Maxillary sinus tenderness and frontal sinus tenderness present. Cardiovascular:      Rate and Rhythm: Normal rate and regular rhythm. Heart sounds: Normal heart sounds and S2 normal.   Pulmonary:      Effort: Pulmonary effort is normal.      Breath sounds: Normal breath sounds. No decreased air movement or transmitted upper airway sounds. No decreased breath sounds, wheezing or rhonchi. Abdominal:      General: Abdomen is protuberant. Bowel sounds are normal.      Palpations: Abdomen is soft. There is no shifting dullness or fluid wave. Tenderness: There is no abdominal tenderness. There is no right CVA tenderness or left CVA tenderness. Hernia: No hernia is present. Musculoskeletal:      Lumbar back: She exhibits decreased range of motion, pain and spasm.  She exhibits no tenderness, no bony tenderness, no edema and no deformity. Lymphadenopathy:      Cervical: No cervical adenopathy. Skin:     General: Skin is warm. Neurological:      Mental Status: She is alert and oriented to person, place, and time. Cranial Nerves: Cranial nerves are intact. No cranial nerve deficit. Motor: Motor function is intact. No weakness or tremor. Coordination: Coordination is intact. Romberg sign negative. Gait: Gait is intact. Gait normal.   Psychiatric:         Attention and Perception: Attention and perception normal.         Mood and Affect: Affect normal. Mood is anxious. Affect is not angry. Speech: Speech normal. She is communicative. Speech is not rapid and pressured. Behavior: Behavior normal. Behavior is not agitated, slowed or withdrawn. Thought Content: Thought content normal. Thought content is not paranoid. Cognition and Memory: Cognition and memory normal. Cognition is not impaired. Judgment: Judgment normal. Judgment is not impulsive or inappropriate. ASSESSMENT AND PLAN      1. Essential hypertension  Fairly controlled, increase lisinopril to 10 mg, monitor blood pressure at home call back with BP readings. - lisinopril (PRINIVIL;ZESTRIL) 10 MG tablet; Take 1 tablet by mouth daily  Dispense: 90 tablet; Refill: 1    2. Fibromyalgia  Refill done urine test is negative  - pregabalin (LYRICA) 150 MG capsule; TAKE 1 CAPSULE BY MOUTH TWO TIMES A DAY  Dispense: 60 capsule; Refill: 0    3. Chronic midline low back pain with right-sided sciatica  Stable on current treatment continue NSAIDs muscle relaxant as needed  - pregabalin (LYRICA) 150 MG capsule; TAKE 1 CAPSULE BY MOUTH TWO TIMES A DAY  Dispense: 60 capsule; Refill: 0  - meloxicam (MOBIC) 15 MG tablet; TAKE 1 TABLET BY MOUTH ONE TIME A DAY AS NEEDED for back pain  Dispense: 60 tablet; Refill: 1    4. Prediabetes  Labs reprinted continue diet control    5.  Major depressive disorder, recurrent, severe without psychotic features ([facility] [address]  Stable continue same medications follow-up with counselor    6. Pain of both hip joints  Refill meds  - pregabalin (LYRICA) 150 MG capsule; TAKE 1 CAPSULE BY MOUTH TWO TIMES A DAY  Dispense: 60 capsule; Refill: 0    7. Class 1 obesity due to excess calories without serious comorbidity with body mass index (BMI) of 33.0 to 33.9 in adult  Stable continue to monitor continue watch your diet    8. Anxiety and depression  Stable on current treatment    9. Acute bacterial sinusitis  Antibiotic and conservative treatment discussed with patient  - azithromycin (ZITHROMAX) 250 MG tablet; 500 mg orally on day one followed by 250 mg daily on days two through five  Dispense: 6 tablet; Refill: 0  - fluticasone (FLONASE) 50 MCG/ACT nasal spray; 2 sprays by Nasal route daily  Dispense: 1 Bottle; Refill: 0  - guaiFENesin (MUCINEX) 600 MG extended release tablet; Take 1 tablet by mouth 2 times daily  Dispense: 30 tablet; Refill: 0    10. Preventative health care    - Hepatitis C Antibody; Future    11. Medication monitoring encounter    - POCT Rapid Drug Screen      Orders Placed This Encounter   Procedures    Hepatitis C Antibody     Standing Status:   Future     Standing Expiration Date:   3/17/2022    POCT Rapid Drug Screen         Medications Discontinued During This Encounter   Medication Reason    meloxicam (MOBIC) 15 MG tablet REORDER    pregabalin (LYRICA) 150 MG capsule REORDER    lisinopril (PRINIVIL;ZESTRIL) 5 MG tablet Rollene Fear received counseling on the following healthy behaviors: nutrition, exercise and medication adherence  Reviewed prior labs and health maintenance  Continue current medications, diet and exercise. Discussed use, benefit, and side effects of prescribed medications. Barriers to medication compliance addressed. Patient given educational materials - see patient instructions  Was a self-tracking handout given in paper form or via [employer]?  Yes Requested Prescriptions     Signed Prescriptions Disp Refills    pregabalin (LYRICA) 150 MG capsule 60 capsule 0     Sig: TAKE 1 CAPSULE BY MOUTH TWO TIMES A DAY    meloxicam (MOBIC) 15 MG tablet 60 tablet 1     Sig: TAKE 1 TABLET BY MOUTH ONE TIME A DAY AS NEEDED for back pain    azithromycin (ZITHROMAX) 250 MG tablet 6 tablet 0     Si mg orally on day one followed by 250 mg daily on days two through five    fluticasone (FLONASE) 50 MCG/ACT nasal spray 1 Bottle 0     Si sprays by Nasal route daily    guaiFENesin (MUCINEX) 600 MG extended release tablet 30 tablet 0     Sig: Take 1 tablet by mouth 2 times daily    lisinopril (PRINIVIL;ZESTRIL) 10 MG tablet 90 tablet 1     Sig: Take 1 tablet by mouth daily       All patient questions answered. Patient voiced understanding. Quality Measures    Body mass index is 33.89 kg/m². Elevated. Weight control planned discussed daily exercise regimen and Healthy diet and regular exercise. BP: (!) 136/100 Blood pressure is high. Treatment plan consists of Weight Reduction, DASH Eating Plan, Dietary Sodium Restriction, Increased Physical Activity, Patient In-home Blood Pressure Monitoring and Antihypertensive Medication Increased. Lab Results   Component Value Date    LDLCHOLESTEROL 96 2019    (goal LDL reduction with dx if diabetes is 50% LDL reduction)      PHQ Scores 3/17/2021 2020 9/10/2019 10/29/2018 2018 2017 2017   PHQ2 Score 1 2 1 6 5 3 4   PHQ9 Score 1 2 1 13 22 3 16     Interpretation of Total Score Depression Severity: 1-4 = Minimal depression, 5-9 = Mild depression, 10-14 = Moderate depression, 15-19 = Moderately severe depression, 20-27 = Severe depression    The patient'spast medical, surgical, social, and family history as well as her   current medications and allergies were reviewed as documented in today's encounter.       Medications, labs, diagnostic studies, consultations andfollow-up as documented in this encounter. Return in about 3 months (around 6/17/2021) for lab work. Patient wasseen with total face to face time of 30 minutes. More than 50% of this visit was counseling and education. Future Appointments   Date Time Provider Peggy Tineo   6/17/2021 11:30 AM Michael Good MD [facility] [address] [facility]     This note was completed by using the assistance of a speech-recognition program. However, inadvertent computerized transcription errors may be present. Althoughevery effort was made to ensure accuracy, no guarantees can be provided that every mistake has been identified and corrected by editing.   Electronically signed by Michael Good MD on 3/17/2021  12:51 PM

## 2021-03-17 NOTE — PROGRESS NOTES
Visit Information    Have you changed or started any medications since your last visit including any over-the-counter medicines, vitamins, or herbal medicines? no   Are you having any side effects from any of your medications? -  no  Have you stopped taking any of your medications? Is so, why? -  no    Have you seen any other physician or provider since your last visit? No  Have you had any other diagnostic tests since your last visit? No  Have you been seen in the emergency room and/or had an admission to a hospital since we last saw you? No  Have you had your routine dental cleaning in the past 6 months? yes -     Have you activated your [employer] account? If not, what are your barriers?  Yes     Patient Care Team:  Deny Jarrell MD as PCP - General (Family Medicine)  Deny Jarrell MD as PCP - [facility]    Medical History Review  Past Medical, Family, and Social History reviewed and does contribute to the patient presenting condition    Health Maintenance   Topic Date Due    Hepatitis C screen  Never done    A1C test (Diabetic or Prediabetic)  09/17/2020    Potassium monitoring  09/17/2020    Creatinine monitoring  09/17/2020    Cervical cancer screen  11/11/2020    Flu vaccine (1) 06/30/2021 (Originally 9/1/2020)    Lipid screen  09/17/2024    DTaP/Tdap/Td vaccine (2 - Td) 04/03/2028    HIV screen  Completed    Hepatitis A vaccine  Aged Out    Hepatitis B vaccine  Aged Out    Hib vaccine  Aged Out    Meningococcal (ACWY) vaccine  Aged Out    Pneumococcal 0-64 years Vaccine  Aged Out    Varicella vaccine  Discontinued

## 2021-05-05 ASSESSMENT — PATIENT HEALTH QUESTIONNAIRE - PHQ9
10. IF YOU CHECKED OFF ANY PROBLEMS, HOW DIFFICULT HAVE THESE PROBLEMS MADE IT FOR YOU TO DO YOUR WORK, TAKE CARE OF THINGS AT HOME, OR GET ALONG WITH OTHER PEOPLE: 2
9. THOUGHTS THAT YOU WOULD BE BETTER OFF DEAD, OR OF HURTING YOURSELF: 0
SUM OF ALL RESPONSES TO PHQ9 QUESTIONS 1 & 2: 6
SUM OF ALL RESPONSES TO PHQ QUESTIONS 1-9: 18
5. POOR APPETITE OR OVEREATING: 2

## 2021-05-06 ENCOUNTER — TELEMEDICINE (OUTPATIENT)
Dept: FAMILY MEDICINE CLINIC | Age: 41
End: 2021-05-06
Payer: COMMERCIAL

## 2021-05-06 DIAGNOSIS — G89.29 CHRONIC MIDLINE LOW BACK PAIN WITH RIGHT-SIDED SCIATICA: ICD-10-CM

## 2021-05-06 DIAGNOSIS — M25.551 PAIN OF BOTH HIP JOINTS: ICD-10-CM

## 2021-05-06 DIAGNOSIS — Z13.31 POSITIVE DEPRESSION SCREENING: ICD-10-CM

## 2021-05-06 DIAGNOSIS — M25.552 PAIN OF BOTH HIP JOINTS: ICD-10-CM

## 2021-05-06 DIAGNOSIS — U07.1 COVID-19 VIRUS INFECTION: ICD-10-CM

## 2021-05-06 DIAGNOSIS — B96.89 ACUTE BACTERIAL SINUSITIS: ICD-10-CM

## 2021-05-06 DIAGNOSIS — B02.8 HERPES ZOSTER WITH OTHER COMPLICATION: Primary | ICD-10-CM

## 2021-05-06 DIAGNOSIS — M79.7 FIBROMYALGIA: ICD-10-CM

## 2021-05-06 DIAGNOSIS — J01.90 ACUTE BACTERIAL SINUSITIS: ICD-10-CM

## 2021-05-06 DIAGNOSIS — M54.41 CHRONIC MIDLINE LOW BACK PAIN WITH RIGHT-SIDED SCIATICA: ICD-10-CM

## 2021-05-06 PROBLEM — B02.9 SHINGLES: Status: ACTIVE | Noted: 2021-05-06

## 2021-05-06 PROCEDURE — 99442 PR PHYS/QHP TELEPHONE EVALUATION 11-20 MIN: CPT | Performed by: FAMILY MEDICINE

## 2021-05-06 PROCEDURE — G8431 POS CLIN DEPRES SCRN F/U DOC: HCPCS | Performed by: FAMILY MEDICINE

## 2021-05-06 RX ORDER — FLUTICASONE PROPIONATE 50 MCG
SPRAY, SUSPENSION (ML) NASAL
Qty: 16 G | Refills: 0 | Status: SHIPPED | OUTPATIENT
Start: 2021-05-06 | End: 2022-04-21

## 2021-05-06 RX ORDER — ACYCLOVIR 50 MG/G
OINTMENT TOPICAL
Qty: 1 TUBE | Refills: 1 | Status: SHIPPED | OUTPATIENT
Start: 2021-05-06 | End: 2021-05-13

## 2021-05-06 RX ORDER — PREGABALIN 150 MG/1
CAPSULE ORAL
Qty: 60 CAPSULE | Refills: 0 | Status: SHIPPED | OUTPATIENT
Start: 2021-05-06 | End: 2021-06-21

## 2021-05-06 NOTE — PROGRESS NOTES
Darrell Singh is a 37 y.o. female evaluated via telephone on 5/6/2021. Chief Complaint   Patient presents with    Concern For COVID-19     covid infection        Consent:  She and/or health care decision maker is aware that that she may receive a bill for this telephone service, depending on her insurance coverage, and has provided verbal consent to proceed: Yes      Documentation:  I communicated with the patient and/or health care decision maker about     Patient scheduled for sick call, reports she had Covid infection April 20, patient was on conservative treatment and recovered. Patient reports she return to work after few days,  She noticed a rash behind her ear and near hairline on left side of forehead. Patient reports forehead has not involved. The rash is in the form of blisters erythematous and is oozing. Patient reports she saw the medical director at work and he gave her acyclovir and also Medrol Dosepak. Patient reports she is having similar pain and she is not able to go to work due to pain. She is not able to sleep on that side. She needs a work note to be off of work. She is also on Lyrica for her fibromyalgia which will also help with her pain. Details of this discussion including any medical advice provided:     Assessment and plan:  1. Herpes zoster with other complication  Continue same medications, working for the pulmonary. Patient can return Wednesday. Discussed conservative treatment . Patient will send pictures through [address] - acyclovir (ZOVIRAX) 5 % ointment; Apply topically 5 times daily. Dispense: 1 Tube; Refill: 1    2. COVID-19 virus infection  Recovered  - acyclovir (ZOVIRAX) 5 % ointment; Apply topically 5 times daily. Dispense: 1 Tube; Refill: 1        I affirm this is a Patient Initiated Episode with a Patient who has not had a related appointment within my department in the past 7 days or scheduled within the next 24 hours.     Patient identification was verified at the start of the visit: Yes    Total Time: minutes: 11-20 minutes    The visit was conducted pursuant to the emergency declaration under the [address] [place] [address] and the [employer] General Act. Patient identification was verified, and a caregiver was present when appropriate. The patient was located in a state where the provider was credentialed to provide care.     Note: not billable if this call serves to triage the patient into an appointment for the relevant concern      Sangita Pagan

## 2021-06-09 DIAGNOSIS — F33.41 RECURRENT MAJOR DEPRESSIVE DISORDER, IN PARTIAL REMISSION (HCC): ICD-10-CM

## 2021-06-09 RX ORDER — VENLAFAXINE HYDROCHLORIDE 75 MG/1
CAPSULE, EXTENDED RELEASE ORAL
Qty: 30 CAPSULE | Refills: 0 | Status: SHIPPED | OUTPATIENT
Start: 2021-06-09 | End: 2021-07-12

## 2021-06-17 ENCOUNTER — HOSPITAL ENCOUNTER (OUTPATIENT)
Age: 41
Discharge: HOME | End: 2021-06-17
Payer: COMMERCIAL

## 2021-06-17 ENCOUNTER — OFFICE VISIT (OUTPATIENT)
Dept: FAMILY MEDICINE CLINIC | Age: 41
End: 2021-06-17
Payer: COMMERCIAL

## 2021-06-17 VITALS
WEIGHT: 203 LBS | HEART RATE: 83 BPM | HEIGHT: 66 IN | SYSTOLIC BLOOD PRESSURE: 120 MMHG | TEMPERATURE: 97.2 F | DIASTOLIC BLOOD PRESSURE: 84 MMHG | OXYGEN SATURATION: 98 % | BODY MASS INDEX: 32.62 KG/M2

## 2021-06-17 DIAGNOSIS — I10 ESSENTIAL HYPERTENSION: ICD-10-CM

## 2021-06-17 DIAGNOSIS — E66.09 CLASS 1 OBESITY DUE TO EXCESS CALORIES WITHOUT SERIOUS COMORBIDITY WITH BODY MASS INDEX (BMI) OF 33.0 TO 33.9 IN ADULT: ICD-10-CM

## 2021-06-17 DIAGNOSIS — F33.41 RECURRENT MAJOR DEPRESSIVE DISORDER, IN PARTIAL REMISSION (HCC): ICD-10-CM

## 2021-06-17 DIAGNOSIS — E11.9 TYPE 2 DIABETES MELLITUS WITHOUT COMPLICATION, WITHOUT LONG-TERM CURRENT USE OF INSULIN (HCC): ICD-10-CM

## 2021-06-17 DIAGNOSIS — I10 ESSENTIAL HYPERTENSION: Primary | ICD-10-CM

## 2021-06-17 DIAGNOSIS — R73.03 PREDIABETES: ICD-10-CM

## 2021-06-17 DIAGNOSIS — Z12.31 SCREENING MAMMOGRAM FOR HIGH-RISK PATIENT: ICD-10-CM

## 2021-06-17 DIAGNOSIS — B37.2 CANDIDAL INTERTRIGO: ICD-10-CM

## 2021-06-17 DIAGNOSIS — F32.A ANXIETY AND DEPRESSION: ICD-10-CM

## 2021-06-17 DIAGNOSIS — F41.9 ANXIETY AND DEPRESSION: ICD-10-CM

## 2021-06-17 DIAGNOSIS — M79.7 FIBROMYALGIA: ICD-10-CM

## 2021-06-17 PROBLEM — R21 RASH DUE TO ALLERGY: Status: RESOLVED | Noted: 2019-08-06 | Resolved: 2021-06-17

## 2021-06-17 PROBLEM — T78.40XA RASH DUE TO ALLERGY: Status: RESOLVED | Noted: 2019-08-06 | Resolved: 2021-06-17

## 2021-06-17 LAB
ALBUMIN SERPL-MCNC: 4.1 G/DL (ref 3.5–5.2)
ALBUMIN/GLOBULIN RATIO: ABNORMAL (ref 1–2.5)
ALP BLD-CCNC: 63 U/L (ref 35–104)
ALT SERPL-CCNC: 23 U/L (ref 5–33)
ANION GAP SERPL CALCULATED.3IONS-SCNC: 12 MMOL/L (ref 9–17)
AST SERPL-CCNC: 28 U/L
BILIRUB SERPL-MCNC: 0.43 MG/DL (ref 0.3–1.2)
BUN BLDV-MCNC: 10 MG/DL (ref 6–20)
BUN/CREAT BLD: ABNORMAL (ref 9–20)
CALCIUM SERPL-MCNC: 8.8 MG/DL (ref 8.6–10.4)
CHLORIDE BLD-SCNC: 101 MMOL/L (ref 98–107)
CHOLESTEROL/HDL RATIO: 5.1
CHOLESTEROL: 187 MG/DL
CO2: 26 MMOL/L (ref 20–31)
CREAT SERPL-MCNC: 0.71 MG/DL (ref 0.5–0.9)
ESTIMATED AVERAGE GLUCOSE: 186 MG/DL
GFR AFRICAN AMERICAN: >60 ML/MIN
GFR NON-AFRICAN AMERICAN: >60 ML/MIN
GFR SERPL CREATININE-BSD FRML MDRD: ABNORMAL ML/MIN/{1.73_M2}
GFR SERPL CREATININE-BSD FRML MDRD: ABNORMAL ML/MIN/{1.73_M2}
GLUCOSE BLD-MCNC: 177 MG/DL (ref 70–99)
HBA1C MFR BLD: 8.1 % (ref 4–6)
HCT VFR BLD CALC: 42.1 % (ref 36–46)
HDLC SERPL-MCNC: 37 MG/DL
HEMOGLOBIN: 14.4 G/DL (ref 12–16)
LDL CHOLESTEROL: 114 MG/DL (ref 0–130)
MCH RBC QN AUTO: 29.8 PG (ref 26–34)
MCHC RBC AUTO-ENTMCNC: 34.2 G/DL (ref 31–37)
MCV RBC AUTO: 87.1 FL (ref 80–100)
NRBC AUTOMATED: NORMAL PER 100 WBC
PDW BLD-RTO: 13.4 % (ref 11.5–14.9)
PLATELET # BLD: 213 K/UL (ref 150–450)
PMV BLD AUTO: 7.7 FL (ref 6–12)
POTASSIUM SERPL-SCNC: 3.9 MMOL/L (ref 3.7–5.3)
RBC # BLD: 4.83 M/UL (ref 4–5.2)
SODIUM BLD-SCNC: 139 MMOL/L (ref 135–144)
TOTAL PROTEIN: 6.9 G/DL (ref 6.4–8.3)
TRIGL SERPL-MCNC: 182 MG/DL
TSH SERPL DL<=0.05 MIU/L-ACNC: 2.33 MIU/L (ref 0.3–5)
VITAMIN D 25-HYDROXY: 15.7 NG/ML (ref 30–100)
VLDLC SERPL CALC-MCNC: ABNORMAL MG/DL (ref 1–30)
WBC # BLD: 8.1 K/UL (ref 3.5–11)

## 2021-06-17 PROCEDURE — 85027 COMPLETE CBC AUTOMATED: CPT

## 2021-06-17 PROCEDURE — 36415 COLL VENOUS BLD VENIPUNCTURE: CPT

## 2021-06-17 PROCEDURE — 80061 LIPID PANEL: CPT

## 2021-06-17 PROCEDURE — 82306 VITAMIN D 25 HYDROXY: CPT

## 2021-06-17 PROCEDURE — 83036 HEMOGLOBIN GLYCOSYLATED A1C: CPT

## 2021-06-17 PROCEDURE — 99214 OFFICE O/P EST MOD 30 MIN: CPT | Performed by: FAMILY MEDICINE

## 2021-06-17 PROCEDURE — 3052F HG A1C>EQUAL 8.0%<EQUAL 9.0%: CPT | Performed by: FAMILY MEDICINE

## 2021-06-17 PROCEDURE — 84443 ASSAY THYROID STIM HORMONE: CPT

## 2021-06-17 PROCEDURE — 80053 COMPREHEN METABOLIC PANEL: CPT

## 2021-06-17 RX ORDER — LANCETS 30 GAUGE
EACH MISCELLANEOUS
Qty: 100 EACH | Refills: 3 | Status: SHIPPED | OUTPATIENT
Start: 2021-06-17

## 2021-06-17 RX ORDER — KETOCONAZOLE 20 MG/G
CREAM TOPICAL
Qty: 1 TUBE | Refills: 1 | Status: SHIPPED | OUTPATIENT
Start: 2021-06-17 | End: 2022-04-21

## 2021-06-17 RX ORDER — METFORMIN HYDROCHLORIDE 500 MG/1
500 TABLET, EXTENDED RELEASE ORAL
Qty: 180 TABLET | Refills: 3 | Status: SHIPPED | OUTPATIENT
Start: 2021-06-17

## 2021-06-17 RX ORDER — GLUCOSAMINE HCL/CHONDROITIN SU 500-400 MG
CAPSULE ORAL
Qty: 100 STRIP | Refills: 3 | Status: SHIPPED | OUTPATIENT
Start: 2021-06-17

## 2021-06-17 SDOH — ECONOMIC STABILITY: FOOD INSECURITY: WITHIN THE PAST 12 MONTHS, YOU WORRIED THAT YOUR FOOD WOULD RUN OUT BEFORE YOU GOT MONEY TO BUY MORE.: NEVER TRUE

## 2021-06-17 SDOH — ECONOMIC STABILITY: TRANSPORTATION INSECURITY
IN THE PAST 12 MONTHS, HAS LACK OF TRANSPORTATION KEPT YOU FROM MEETINGS, WORK, OR FROM GETTING THINGS NEEDED FOR DAILY LIVING?: NO

## 2021-06-17 SDOH — ECONOMIC STABILITY: HOUSING INSECURITY
IN THE LAST 12 MONTHS, WAS THERE A TIME WHEN YOU DID NOT HAVE A STEADY PLACE TO SLEEP OR SLEPT IN A SHELTER (INCLUDING NOW)?: NO

## 2021-06-17 SDOH — ECONOMIC STABILITY: TRANSPORTATION INSECURITY
IN THE PAST 12 MONTHS, HAS THE LACK OF TRANSPORTATION KEPT YOU FROM MEDICAL APPOINTMENTS OR FROM GETTING MEDICATIONS?: NO

## 2021-06-17 SDOH — ECONOMIC STABILITY: FOOD INSECURITY: WITHIN THE PAST 12 MONTHS, THE FOOD YOU BOUGHT JUST DIDN'T LAST AND YOU DIDN'T HAVE MONEY TO GET MORE.: NEVER TRUE

## 2021-06-17 SDOH — ECONOMIC STABILITY: INCOME INSECURITY: IN THE LAST 12 MONTHS, WAS THERE A TIME WHEN YOU WERE NOT ABLE TO PAY THE MORTGAGE OR RENT ON TIME?: NO

## 2021-06-17 ASSESSMENT — ENCOUNTER SYMPTOMS
COUGH: 0
PHOTOPHOBIA: 0
SINUS PRESSURE: 0
ABDOMINAL DISTENTION: 0
SHORTNESS OF BREATH: 0
DIARRHEA: 0
WHEEZING: 0
CHEST TIGHTNESS: 0
VOMITING: 0
COLOR CHANGE: 1
BACK PAIN: 1
RHINORRHEA: 0
CONSTIPATION: 0

## 2021-06-17 ASSESSMENT — PATIENT HEALTH QUESTIONNAIRE - PHQ9
2. FEELING DOWN, DEPRESSED OR HOPELESS: 3
4. FEELING TIRED OR HAVING LITTLE ENERGY: 1
8. MOVING OR SPEAKING SO SLOWLY THAT OTHER PEOPLE COULD HAVE NOTICED. OR THE OPPOSITE, BEING SO FIGETY OR RESTLESS THAT YOU HAVE BEEN MOVING AROUND A LOT MORE THAN USUAL: 0
SUM OF ALL RESPONSES TO PHQ QUESTIONS 1-9: 8
5. POOR APPETITE OR OVEREATING: 1
6. FEELING BAD ABOUT YOURSELF - OR THAT YOU ARE A FAILURE OR HAVE LET YOURSELF OR YOUR FAMILY DOWN: 1
7. TROUBLE CONCENTRATING ON THINGS, SUCH AS READING THE NEWSPAPER OR WATCHING TELEVISION: 1
1. LITTLE INTEREST OR PLEASURE IN DOING THINGS: 0
9. THOUGHTS THAT YOU WOULD BE BETTER OFF DEAD, OR OF HURTING YOURSELF: 0
SUM OF ALL RESPONSES TO PHQ9 QUESTIONS 1 & 2: 3
3. TROUBLE FALLING OR STAYING ASLEEP: 1
10. IF YOU CHECKED OFF ANY PROBLEMS, HOW DIFFICULT HAVE THESE PROBLEMS MADE IT FOR YOU TO DO YOUR WORK, TAKE CARE OF THINGS AT HOME, OR GET ALONG WITH OTHER PEOPLE: 1

## 2021-06-17 ASSESSMENT — SOCIAL DETERMINANTS OF HEALTH (SDOH): HOW HARD IS IT FOR YOU TO PAY FOR THE VERY BASICS LIKE FOOD, HOUSING, MEDICAL CARE, AND HEATING?: NOT HARD AT ALL

## 2021-06-17 NOTE — PROGRESS NOTES
Hematocrit 06/17/2021 42.1  36 - 46 % Final    MCV 06/17/2021 87.1  80 - 100 fL Final    MCH 06/17/2021 29.8  26 - 34 pg Final    MCHC 06/17/2021 34.2  31 - 37 g/dL Final    RDW 06/17/2021 13.4  11.5 - 14.9 % Final    Platelets 46/25/5268 213  150 - 450 k/uL Final    MPV 06/17/2021 7.7  6.0 - 12.0 fL Final    NRBC Automated 06/17/2021 NOT REPORTED  per 100 WBC Final    Glucose 06/17/2021 177* 70 - 99 mg/dL Final    BUN 06/17/2021 10  6 - 20 mg/dL Final    CREATININE 06/17/2021 0.71  0.50 - 0.90 mg/dL Final    Bun/Cre Ratio 06/17/2021 NOT REPORTED  9 - 20 Final    Calcium 06/17/2021 8.8  8.6 - 10.4 mg/dL Final    Sodium 06/17/2021 139  135 - 144 mmol/L Final    Potassium 06/17/2021 3.9  3.7 - 5.3 mmol/L Final    Chloride 06/17/2021 101  98 - 107 mmol/L Final    CO2 06/17/2021 26  20 - 31 mmol/L Final    Anion Gap 06/17/2021 12  9 - 17 mmol/L Final    Alkaline Phosphatase 06/17/2021 63  35 - 104 U/L Final    ALT 06/17/2021 23  5 - 33 U/L Final    AST 06/17/2021 28  <32 U/L Final    Total Bilirubin 06/17/2021 0.43  0.3 - 1.2 mg/dL Final    Total Protein 06/17/2021 6.9  6.4 - 8.3 g/dL Final    Albumin 06/17/2021 4.1  3.5 - 5.2 g/dL Final    Albumin/Globulin Ratio 06/17/2021 NOT REPORTED  1.0 - 2.5 Final    GFR Non- 06/17/2021 >60  >60 mL/min Final    GFR  06/17/2021 >60  >60 mL/min Final    GFR Comment 06/17/2021        Final    Comment: Average GFR for 38-51 years old:   80 mL/min/1.73sq m  Chronic Kidney Disease:   <60 mL/min/1.73sq m  Kidney failure:   <15 mL/min/1.73sq m              eGFR calculated using average adult body mass.  Additional eGFR calculator available at:        [email or web address]            GFR Staging 06/17/2021 NOT REPORTED   Final    Hemoglobin A1C 06/17/2021 8.1* 4.0 - 6.0 % Final    Estimated Avg Glucose 06/17/2021 186  mg/dL Final    Comment: The ADA and AACC recommend providing the estimated average glucose result to permit better   patient understanding of their HBA1c result.  TSH 06/17/2021 2.33  0.30 - 5.00 mIU/L Final    Vit D, 25-Hydroxy 06/17/2021 15.7* 30.0 - 100.0 ng/mL Final    Comment:    Reference Range:  Vitamin D status         Range   Deficiency              <20 ng/mL   Mild Deficiency       20-30 ng/mL   Sufficiency           ng/mL   Toxicity               >100 ng/mL      Cholesterol 06/17/2021 187  <200 mg/dL Final    Comment:    Cholesterol Guidelines:      <200  Desirable   200-240  Borderline      >240  Undesirable         HDL 06/17/2021 37* >40 mg/dL Final    Comment:    HDL Guidelines:    <40     Undesirable   40-59    Borderline    >59     Desirable         LDL Cholesterol 06/17/2021 114  0 - 130 mg/dL Final    Comment:    LDL Guidelines:     <100    Desirable   100-129   Near to/above Desirable   130-159   Borderline      >159   Undesirable     Direct (measured) LDL and calculated LDL are not interchangeable tests.  Chol/HDL Ratio 06/17/2021 5.1* <5 Final            Triglycerides 06/17/2021 182* <150 mg/dL Final    Comment:    Triglyceride Guidelines:     <150   Desirable   150-199  Borderline   200-499  High     >499   Very high   Based on AHA Guidelines for fasting triglyceride, October 2012.          VLDL 06/17/2021 NOT REPORTED* 1 - 30 mg/dL Final         Most recent labs reviewed:     Lab Results   Component Value Date    WBC 8.1 06/17/2021    HGB 14.4 06/17/2021    HCT 42.1 06/17/2021    MCV 87.1 06/17/2021     06/17/2021       @BRIEFLAB(NA,K,CL,CO2,BUN,CREATININE,GLUCOSE,CALCIUM)@     Lab Results   Component Value Date    ALT 23 06/17/2021    AST 28 06/17/2021    ALKPHOS 63 06/17/2021    BILITOT 0.43 06/17/2021       Lab Results   Component Value Date    TSH 2.33 06/17/2021       Lab Results   Component Value Date    CHOL 187 06/17/2021    CHOL 164 09/17/2019    CHOL 166 04/05/2018     Lab Results   Component Value Date    TRIG 182 (H) 06/17/2021    TRIG 135 09/17/2019    TRIG 256 (H) 04/05/2018     Lab Results   Component Value Date    HDL 37 (L) 06/17/2021    HDL 41 09/17/2019    HDL 41 04/05/2018     Lab Results   Component Value Date    LDLCHOLESTEROL 114 06/17/2021    LDLCHOLESTEROL 96 09/17/2019    LDLCHOLESTEROL 74 04/05/2018     Lab Results   Component Value Date    VLDL NOT REPORTED (H) 06/17/2021    VLDL NOT REPORTED 09/17/2019    VLDL NOT REPORTED 04/05/2018     Lab Results   Component Value Date    CHOLHDLRATIO 5.1 (H) 06/17/2021    CHOLHDLRATIO 4.0 09/17/2019    CHOLHDLRATIO 4.0 04/05/2018       Lab Results   Component Value Date    LABA1C 8.1 (H) 06/17/2021       No results found for: [id number]    No results found for: FOLATE    No results found for: IRON, TIBC, FERRITIN    Lab Results   Component Value Date    VITD25 15.7 (L) 06/17/2021             Current Outpatient Medications   Medication Sig Dispense Refill    ketoconazole (NIZORAL) 2 % cream Apply topically  2 times a day. 1 Tube 1    venlafaxine (EFFEXOR XR) 75 MG extended release capsule TAKE 1 CAPSULE BY MOUTH EVERY DAY  30 capsule 0    fluticasone (FLONASE) 50 MCG/ACT nasal spray INHALE 2 SPRAYS IN EACH NOSTRIL ONE TIME A DAY  16 g 0    meloxicam (MOBIC) 15 MG tablet TAKE 1 TABLET BY MOUTH ONE TIME A DAY AS NEEDED for back pain 60 tablet 1    lisinopril (PRINIVIL;ZESTRIL) 10 MG tablet Take 1 tablet by mouth daily 90 tablet 1    venlafaxine (EFFEXOR XR) 150 MG extended release capsule TAKE 1 CAPSULE BY MOUTH ONE TIME A DAY ALONG WITH 75 MG CAPSULE 90 capsule 3    tiZANidine (ZANAFLEX) 4 MG tablet TAKE 1 TABLET BY MOUTH ONE TIME A DAY AS NEEDED FOR LOW BACK PAIN.  30 tablet 0    traZODone (DESYREL) 100 MG tablet       Elastic Bandages & Supports (WRIST BRACE/LEFT MEDIUM) MISC USE DAILY FOR HAND PAIN 1 each 0    Elastic Bandages & Supports (WRIST BRACE/RIGHT MEDIUM) MISC USE DAILY FOR HAND PAIN 1 each 0    risperiDONE (RISPERDAL) 2 MG tablet TAKE 1 TABLET BY MOUTH AT BEDTIME  2    amphetamine-dextroamphetamine (ADDERALL) 7.5 MG tablet TAKE 1 TABLET BY MOUTH TWO TIMES A DAY  0    Cholecalciferol (VITAMIN D3) 5000 units CAPS TAKE 1 CAPSULE BY MOUTH ONE TIME A DAY  1    pregabalin (LYRICA) 150 MG capsule TAKE 1 CAPSULE BY MOUTH TWO TIMES A DAY  60 capsule 0     No current facility-administered medications for this visit. Social History     Socioeconomic History    Marital status:      Spouse name: Not on file    Number of children: Not on file    Years of education: Not on file    Highest education level: Not on file   Occupational History    Not on file   Tobacco Use    Smoking status: Never Smoker    Smokeless tobacco: Never Used   Vaping Use    Vaping Use: Never used   Substance and Sexual Activity    Alcohol use: No     Alcohol/week: 0.0 standard drinks    Drug use: No    Sexual activity: Yes     Partners: Male   Other Topics Concern    Not on file   Social History Narrative    Not on file     Social Determinants of Health     Financial Resource Strain: Low Risk     Difficulty of Paying Living Expenses: Not hard at all   Food Insecurity: No Food Insecurity    Worried About [address] [employer] in the Last Year: Never true    [facility] [employer] in the Last Year: Never true   Transportation Needs: No Transportation Needs    Lack of Transportation (Medical): No    Lack of Transportation (Non-Medical):  No   Physical Activity:     Days of Exercise per Week:     Minutes of Exercise per Session:    Stress:     Feeling of Stress :    Social Connections:     Frequency of Communication with Friends and Family:     Frequency of Social Gatherings with Friends and Family:     Attends [employer] Services:     Active Member of Clubs or Organizations:     Attends Club or Organization Meetings:     Marital Status:    Intimate Partner Violence:     Fear of Current or Ex-Partner:     Emotionally Abused:     Physically Abused:     Sexually Abused:      Counseling given: Not Answered        Family History   Problem Relation Age of Onset    Arthritis Mother     Depression Mother     High Blood Pressure Mother     Colon Cancer Mother     High Blood Pressure Father     Cervical Cancer Maternal Grandmother     Cancer Paternal Grandmother         melanoma bladder kidney    Cancer Paternal Grandfather         melanoma             -rest of complaints with corresponding details per ROS    The patient's past medical, surgical, social, and family history as well as her current medications and allergies were reviewed as documented intoday's encounter. Review of Systems   Constitutional: Positive for fatigue. Negative for activity change, appetite change, diaphoresis and unexpected weight change. HENT: Negative for congestion, ear pain, postnasal drip, rhinorrhea, sinus pressure and tinnitus. Eyes: Negative for photophobia and visual disturbance. Respiratory: Negative for cough, chest tightness, shortness of breath and wheezing. Cardiovascular: Negative for chest pain, palpitations and leg swelling. Gastrointestinal: Negative for abdominal distention, constipation, diarrhea and vomiting. Endocrine: Negative for polyuria. Genitourinary: Negative for frequency and urgency. Musculoskeletal: Positive for arthralgias, back pain and myalgias. Negative for neck pain. Skin: Positive for color change and rash. Negative for wound. Neurological: Positive for weakness and numbness. Negative for dizziness, light-headedness and headaches. Psychiatric/Behavioral: Positive for decreased concentration. Negative for agitation, dysphoric mood, hallucinations and sleep disturbance. The patient is nervous/anxious. Physical Exam  Vitals and nursing note reviewed. Constitutional:       Appearance: Normal appearance. She is obese. HENT:      Head: Normocephalic and atraumatic.       Nose: Nose normal.   Eyes:      Extraocular Movements: Extraocular movements intact. Pupils: Pupils are equal, round, and reactive to light. Cardiovascular:      Rate and Rhythm: Normal rate and regular rhythm. Heart sounds: Normal heart sounds. Pulmonary:      Effort: Pulmonary effort is normal.      Breath sounds: Normal breath sounds. No wheezing or rhonchi. Abdominal:      General: Bowel sounds are normal. There is no distension. Palpations: Abdomen is soft. Tenderness: There is no abdominal tenderness. There is no rebound. Musculoskeletal:      Cervical back: Normal range of motion. Lumbar back: Spasms present. No deformity, tenderness or bony tenderness. Decreased range of motion. Lymphadenopathy:      Cervical: No cervical adenopathy. Skin:     General: Skin is warm. Findings: Erythema and rash present. No petechiae or wound. Comments: Mild erythema on chest, no discharge or any bruising seen. Neurological:      Mental Status: She is alert and oriented to person, place, and time. Cranial Nerves: Cranial nerves are intact. No cranial nerve deficit. Motor: Motor function is intact. No weakness. Coordination: Coordination is intact. Gait: Gait is intact. Gait normal.   Psychiatric:         Attention and Perception: Attention and perception normal.         Mood and Affect: Mood is anxious. Mood is not depressed. Speech: Speech normal. She is communicative. Behavior: Behavior is slowed. Behavior is not agitated. Thought Content: Thought content normal. Thought content is not paranoid or delusional.             ASSESSMENT AND PLAN      1. Essential hypertension  Controlled continue same medications. Monitor blood pressure at home    2. Type 2 diabetes mellitus without complication, without long-term current use of insulin ([address]  Newly diagnosed, discussed with patient to start on Metformin twice daily A1c is 8.1 monitor blood sugars all supplies ordered. - Lipid Panel; Future    3.  Class 1 obesity due to excess calories without serious comorbidity with body mass index (BMI) of 33.0 to 33.9 in adult  Continue weight reduction. Continue lifestyle changes  - Lipid Panel; Future    4. Anxiety and depression  Fairly stable follow-up with psychiatrist    5. Recurrent major depressive disorder, in partial remission [facility])  Follow-up with psychiatrist    6. Candidal intertrigo    - ketoconazole (NIZORAL) 2 % cream; Apply topically  2 times a day. Dispense: 1 Tube; Refill: 1    7. Screening mammogram for high-risk patient    - ALPESH DIGITAL SCREEN W OR WO CAD BILATERAL; Future      Orders Placed This Encounter   Procedures    ALPESH DIGITAL SCREEN W OR WO CAD BILATERAL     Standing Status:   Future     Standing Expiration Date:   8/18/2022     Order Specific Question:   Reason for exam:     Answer:   screening mammogram    Lipid Panel     Standing Status:   Future     Number of Occurrences:   1     Standing Expiration Date:   6/17/2022     Order Specific Question:   Is Patient Fasting?/# of Hours     Answer:   yes, 8-10 hours         There are no discontinued medications. Swapna Wesly received counseling on the following healthy behaviors: nutrition, exercise and medication adherence  Reviewed prior labs and health maintenance  Continue current medications, diet and exercise. Discussed use, benefit, and side effects of prescribed medications. Barriers to medication compliance addressed. Patient given educational materials - see patient instructions  Was a self-tracking handout given in paper form or via [facility]? Yes    Requested Prescriptions     Signed Prescriptions Disp Refills    ketoconazole (NIZORAL) 2 % cream 1 Tube 1     Sig: Apply topically  2 times a day. All patient questions answered. Patient voiced understanding. Quality Measures    Body mass index is 32.77 kg/m². Elevated. Weight control planned discussed daily exercise regimen and Healthy diet and regular exercise.     BP: 120/84 Blood pressure is normal. Treatment plan consists of Weight Reduction, DASH Eating Plan, Dietary Sodium Restriction and No treatment change needed. Lab Results   Component Value Date    LDLCHOLESTEROL 114 06/17/2021    (goal LDL reduction with dx if diabetes is 50% LDL reduction)      PHQ Scores 6/17/2021 5/5/2021 3/17/2021 8/18/2020 9/10/2019 10/29/2018 1/31/2018   PHQ2 Score 3 6 1 2 1 6 5   PHQ9 Score 8 18 1 2 1 13 22     Interpretation of Total Score Depression Severity: 1-4 = Minimal depression, 5-9 = Mild depression, 10-14 = Moderate depression, 15-19 = Moderately severe depression, 20-27 = Severe depression    The patient'spast medical, surgical, social, and family history as well as her   current medications and allergies were reviewed as documented in today's encounter. Medications, labs, diagnostic studies, consultations andfollow-up as documented in this encounter. Return in about 4 months (around 10/17/2021) for  , htn, hld, prediabeties. Patient wasseen with total face to face time of 40 minutes. More than 50% of this visit was counseling and education. Future Appointments   Date Time Provider Peggy Tineo   10/18/2021 10:15 AM Rod[facility]tron, [facility]     This note was completed by using the assistance of a speech-recognition program. However, inadvertent computerized transcription errors may be present. Althoughevery effort was made to ensure accuracy, no guarantees can be provided that every mistake has been identified and corrected by editing.   Electronically signed by Roddie Gottron, MD on 6/17/2021  12:28 PM

## 2021-06-17 NOTE — PROGRESS NOTES
Visit Information    Have you changed or started any medications since your last visit including any over-the-counter medicines, vitamins, or herbal medicines? no   Are you having any side effects from any of your medications? -  no  Have you stopped taking any of your medications? Is so, why? -  no    Have you seen any other physician or provider since your last visit? No  Have you had any other diagnostic tests since your last visit? No  Have you been seen in the emergency room and/or had an admission to a hospital since we last saw you? No  Have you had your routine dental cleaning in the past 6 months? yes     Have you activated your [employer] account? If not, what are your barriers?  Yes     Patient Care Team:  Cali Choi MD as PCP - General (Family Medicine)  Cali Choi MD as PCP - [facility]    Medical History Review  Past Medical, Family, and Social History reviewed and does contribute to the patient presenting condition    Health Maintenance   Topic Date Due    Hepatitis C screen  Never done    COVID-19 Vaccine (1) Never done    A1C test (Diabetic or Prediabetic)  09/17/2020    Cervical cancer screen  11/11/2020    Flu vaccine (Season Ended) 09/01/2021    Potassium monitoring  06/17/2022    Creatinine monitoring  06/17/2022    Lipid screen  09/17/2024    DTaP/Tdap/Td vaccine (2 - Td or Tdap) 04/03/2028    HIV screen  Completed    Hepatitis A vaccine  Aged Out    Hepatitis B vaccine  Aged Out    Hib vaccine  Aged Out    Meningococcal (ACWY) vaccine  Aged Out    Pneumococcal 0-64 years Vaccine  Aged Out    Varicella vaccine  Discontinued

## 2021-06-18 DIAGNOSIS — M25.551 PAIN OF BOTH HIP JOINTS: ICD-10-CM

## 2021-06-18 DIAGNOSIS — G89.29 CHRONIC MIDLINE LOW BACK PAIN WITH RIGHT-SIDED SCIATICA: ICD-10-CM

## 2021-06-18 DIAGNOSIS — M79.7 FIBROMYALGIA: ICD-10-CM

## 2021-06-18 DIAGNOSIS — M54.41 CHRONIC MIDLINE LOW BACK PAIN WITH RIGHT-SIDED SCIATICA: ICD-10-CM

## 2021-06-18 DIAGNOSIS — M25.552 PAIN OF BOTH HIP JOINTS: ICD-10-CM

## 2021-06-21 RX ORDER — PREGABALIN 150 MG/1
CAPSULE ORAL
Qty: 60 CAPSULE | Refills: 0 | Status: SHIPPED | OUTPATIENT
Start: 2021-06-21 | End: 2021-07-22

## 2021-07-05 DIAGNOSIS — G89.29 CHRONIC MIDLINE LOW BACK PAIN WITH RIGHT-SIDED SCIATICA: ICD-10-CM

## 2021-07-05 DIAGNOSIS — M54.41 CHRONIC MIDLINE LOW BACK PAIN WITH RIGHT-SIDED SCIATICA: ICD-10-CM

## 2021-07-06 RX ORDER — MELOXICAM 15 MG/1
TABLET ORAL
Qty: 60 TABLET | Refills: 0 | Status: SHIPPED | OUTPATIENT
Start: 2021-07-06 | End: 2021-09-07

## 2021-07-09 DIAGNOSIS — F33.41 RECURRENT MAJOR DEPRESSIVE DISORDER, IN PARTIAL REMISSION (HCC): ICD-10-CM

## 2021-07-09 NOTE — TELEPHONE ENCOUNTER
Please Approve or Refuse.   Send to Pharmacy per Pt's Request:     Next Visit Date:  10/18/2021   Last Visit Date: 6/17/2021    Hemoglobin A1C (%)   Date Value   06/17/2021 8.1 (H)   09/17/2019 5.9   09/17/2018 5.8             ( goal A1C is < 7)   BP Readings from Last 3 Encounters:   06/17/21 120/84   03/17/21 (!) 136/100   08/18/20 118/82          (goal 120/80)  BUN   Date Value Ref Range Status   06/17/2021 10 6 - 20 mg/dL Final     CREATININE   Date Value Ref Range Status   06/17/2021 0.71 0.50 - 0.90 mg/dL Final     Potassium   Date Value Ref Range Status   06/17/2021 3.9 3.7 - 5.3 mmol/L Final

## 2021-07-12 RX ORDER — VENLAFAXINE HYDROCHLORIDE 75 MG/1
CAPSULE, EXTENDED RELEASE ORAL
Qty: 30 CAPSULE | Refills: 0 | Status: SHIPPED | OUTPATIENT
Start: 2021-07-12 | End: 2021-09-07

## 2021-07-20 DIAGNOSIS — M79.7 FIBROMYALGIA: ICD-10-CM

## 2021-07-20 DIAGNOSIS — M25.552 PAIN OF BOTH HIP JOINTS: ICD-10-CM

## 2021-07-20 DIAGNOSIS — M54.41 CHRONIC MIDLINE LOW BACK PAIN WITH RIGHT-SIDED SCIATICA: ICD-10-CM

## 2021-07-20 DIAGNOSIS — G89.29 CHRONIC MIDLINE LOW BACK PAIN WITH RIGHT-SIDED SCIATICA: ICD-10-CM

## 2021-07-20 DIAGNOSIS — M25.551 PAIN OF BOTH HIP JOINTS: ICD-10-CM

## 2021-07-22 RX ORDER — PREGABALIN 150 MG/1
CAPSULE ORAL
Qty: 60 CAPSULE | Refills: 0 | Status: SHIPPED | OUTPATIENT
Start: 2021-07-22 | End: 2021-09-14

## 2021-08-24 ENCOUNTER — OFFICE VISIT (OUTPATIENT)
Dept: FAMILY MEDICINE CLINIC | Age: 41
End: 2021-08-24
Payer: COMMERCIAL

## 2021-08-24 VITALS
BODY MASS INDEX: 32.47 KG/M2 | HEIGHT: 66 IN | DIASTOLIC BLOOD PRESSURE: 80 MMHG | SYSTOLIC BLOOD PRESSURE: 130 MMHG | TEMPERATURE: 98.3 F | OXYGEN SATURATION: 99 % | WEIGHT: 202 LBS | HEART RATE: 107 BPM

## 2021-08-24 DIAGNOSIS — F41.9 ANXIETY AND DEPRESSION: ICD-10-CM

## 2021-08-24 DIAGNOSIS — I10 ESSENTIAL HYPERTENSION: ICD-10-CM

## 2021-08-24 DIAGNOSIS — F33.2 MAJOR DEPRESSIVE DISORDER, RECURRENT, SEVERE WITHOUT PSYCHOTIC FEATURES (HCC): ICD-10-CM

## 2021-08-24 DIAGNOSIS — Z23 NEED FOR PNEUMOCOCCAL VACCINATION: ICD-10-CM

## 2021-08-24 DIAGNOSIS — E66.09 CLASS 1 OBESITY DUE TO EXCESS CALORIES WITHOUT SERIOUS COMORBIDITY WITH BODY MASS INDEX (BMI) OF 33.0 TO 33.9 IN ADULT: ICD-10-CM

## 2021-08-24 DIAGNOSIS — Z12.31 SCREENING MAMMOGRAM, ENCOUNTER FOR: ICD-10-CM

## 2021-08-24 DIAGNOSIS — F32.A ANXIETY AND DEPRESSION: ICD-10-CM

## 2021-08-24 DIAGNOSIS — M79.7 FIBROMYALGIA: ICD-10-CM

## 2021-08-24 DIAGNOSIS — E11.9 TYPE 2 DIABETES MELLITUS WITHOUT COMPLICATION, WITHOUT LONG-TERM CURRENT USE OF INSULIN (HCC): Primary | ICD-10-CM

## 2021-08-24 PROCEDURE — 3052F HG A1C>EQUAL 8.0%<EQUAL 9.0%: CPT | Performed by: FAMILY MEDICINE

## 2021-08-24 PROCEDURE — 99214 OFFICE O/P EST MOD 30 MIN: CPT | Performed by: FAMILY MEDICINE

## 2021-08-24 ASSESSMENT — ENCOUNTER SYMPTOMS
CONSTIPATION: 0
ABDOMINAL DISTENTION: 0
SINUS PRESSURE: 0
DIARRHEA: 0
CHEST TIGHTNESS: 0
ABDOMINAL PAIN: 0
NAUSEA: 0
PHOTOPHOBIA: 0
WHEEZING: 0
BACK PAIN: 1
SHORTNESS OF BREATH: 0
COUGH: 0

## 2021-08-24 NOTE — PROGRESS NOTES
Visit Information    Have you changed or started any medications since your last visit including any over-the-counter medicines, vitamins, or herbal medicines? no   Are you having any side effects from any of your medications? -  no  Have you stopped taking any of your medications? Is so, why? -  no    Have you seen any other physician or provider since your last visit? No  Have you had any other diagnostic tests since your last visit? No  Have you been seen in the emergency room and/or had an admission to a hospital since we last saw you? No  Have you had your routine dental cleaning in the past 6 months? yes     Have you activated your [employer] account? If not, what are your barriers?  Yes     Patient Care Team:  Lennox Dudley MD as PCP - General (Family Medicine)  Lennox Dudley MD as PCP - [address] [place] [address] Dr Jones Provider    Medical History Review  Past Medical, Family, and Social History reviewed and does contribute to the patient presenting condition    Health Maintenance   Topic Date Due    Hepatitis C screen  Never done    Pneumococcal 0-64 years Vaccine (1 of 2 - PPSV23) Never done    Diabetic foot exam  Never done    Diabetic retinal exam  Never done    COVID-19 Vaccine (1) Never done    Diabetic microalbuminuria test  Never done    Cervical cancer screen  11/11/2020    Flu vaccine (1) 09/01/2021    A1C test (Diabetic or Prediabetic)  06/17/2022    Lipid screen  06/17/2022    Potassium monitoring  06/17/2022    Creatinine monitoring  06/17/2022    DTaP/Tdap/Td vaccine (2 - Td or Tdap) 04/03/2028    HIV screen  Completed    Hepatitis A vaccine  Aged Out    Hepatitis B vaccine  Aged Out    Hib vaccine  Aged Out    Meningococcal (ACWY) vaccine  Aged Out    Varicella vaccine  Discontinued

## 2021-08-24 NOTE — PROGRESS NOTES
Chief Complaint   Patient presents with    Other     FMLA paper work.  Health Maintenance     Pt would like to discuss covid vaccine. Due ppsv23,diabetic eye and foot exam. and also mammogram.         [facility]  here today for follow up on chronic medical problems, go over labs and/or diagnostic studies, and medication refills. Other (FMLA paper work.) and Health Maintenance (Pt would like to discuss covid vaccine. Due ppsv23,diabetic eye and foot exam. and also mammogram.)      HPI: Patient is here for follow-up on diabetes and also to discuss FMLA. Patient has history of diabetes is on Metformin reports sugars are improved needs repeat A1c in 2 weeks. Hypertension controlled    Patient has history of fibromyalgia and depression also anxiety. Patient is on medication is controlled sometimes gets flareups. Patient reports she needs to fill FMLA papers usually misses 1 to 2 days in a month on occasional basis. Patient reports she gets pain and also her depression gets worse at that time and she does not feel going to the work. Patient also wants to sign papers to refuse all vaccinations. Patient reports they have mandatory to get vaccinated against Covid, she works in a nursing home, patient reports she does not want to get vaccinated and she needs papers to be filled. Patient reports she has fair due to vaccine because she had close ones will get vaccinated and did not do good on vaccine, she already had Covid and she did good with that. Reports they might lose her job if they do not get vaccinated. /80   Pulse 107   Temp 98.3 °F (36.8 °C)   Ht 5' 6\" (1.676 m)   Wt 202 lb (91.6 kg)   LMP 07/26/2021   SpO2 99%   BMI 32.60 kg/m²    Body mass index is 32.6 kg/m². Wt Readings from Last 3 Encounters:   08/24/21 202 lb (91.6 kg)   06/17/21 203 lb (92.1 kg)   03/17/21 210 lb (95.3 kg)        []Negative depression screening.   PHQ Scores 6/17/2021 5/5/2021 3/17/2021 8/18/2020 9/10/2019 10/29/2018 1/31/2018   PHQ2 Score 3 6 1 2 1 6 5   PHQ9 Score 8 18 1 2 1 13 22      []1-4 = Minimal depression   []5-9 = Milddepression   [x]10-14 = Moderate depression   []15-19 = Moderately severe depression   []20-27 = Severe depression    Discussed testing with the patient and all questions fully answered.     Hospital Outpatient Visit on 06/17/2021   Component Date Value Ref Range Status    WBC 06/17/2021 8.1  3.5 - 11.0 k/uL Final    RBC 06/17/2021 4.83  4.0 - 5.2 m/uL Final    Hemoglobin 06/17/2021 14.4  12.0 - 16.0 g/dL Final    Hematocrit 06/17/2021 42.1  36 - 46 % Final    MCV 06/17/2021 87.1  80 - 100 fL Final    MCH 06/17/2021 29.8  26 - 34 pg Final    MCHC 06/17/2021 34.2  31 - 37 g/dL Final    RDW 06/17/2021 13.4  11.5 - 14.9 % Final    Platelets 72/80/8854 213  150 - 450 k/uL Final    MPV 06/17/2021 7.7  6.0 - 12.0 fL Final    NRBC Automated 06/17/2021 NOT REPORTED  per 100 WBC Final    Glucose 06/17/2021 177* 70 - 99 mg/dL Final    BUN 06/17/2021 10  6 - 20 mg/dL Final    CREATININE 06/17/2021 0.71  0.50 - 0.90 mg/dL Final    Bun/Cre Ratio 06/17/2021 NOT REPORTED  9 - 20 Final    Calcium 06/17/2021 8.8  8.6 - 10.4 mg/dL Final    Sodium 06/17/2021 139  135 - 144 mmol/L Final    Potassium 06/17/2021 3.9  3.7 - 5.3 mmol/L Final    Chloride 06/17/2021 101  98 - 107 mmol/L Final    CO2 06/17/2021 26  20 - 31 mmol/L Final    Anion Gap 06/17/2021 12  9 - 17 mmol/L Final    Alkaline Phosphatase 06/17/2021 63  35 - 104 U/L Final    ALT 06/17/2021 23  5 - 33 U/L Final    AST 06/17/2021 28  <32 U/L Final    Total Bilirubin 06/17/2021 0.43  0.3 - 1.2 mg/dL Final    Total Protein 06/17/2021 6.9  6.4 - 8.3 g/dL Final    Albumin 06/17/2021 4.1  3.5 - 5.2 g/dL Final    Albumin/Globulin Ratio 06/17/2021 NOT REPORTED  1.0 - 2.5 Final    GFR Non- 06/17/2021 >60  >60 mL/min Final    GFR  06/17/2021 >60  >60 mL/min Final    GFR Comment 06/17/2021 Final    Comment: Average GFR for 38-51 years old:   80 mL/min/1.73sq m  Chronic Kidney Disease:   <60 mL/min/1.73sq m  Kidney failure:   <15 mL/min/1.73sq m              eGFR calculated using average adult body mass. Additional eGFR calculator available at:        [email or web address]            GFR Staging 06/17/2021 NOT REPORTED   Final    Hemoglobin A1C 06/17/2021 8.1* 4.0 - 6.0 % Final    Estimated Avg Glucose 06/17/2021 186  mg/dL Final    Comment: The ADA and AACC recommend providing the estimated average glucose result to permit better   patient understanding of their HBA1c result.  TSH 06/17/2021 2.33  0.30 - 5.00 mIU/L Final    Vit D, 25-Hydroxy 06/17/2021 15.7* 30.0 - 100.0 ng/mL Final    Comment:    Reference Range:  Vitamin D status         Range   Deficiency              <20 ng/mL   Mild Deficiency       20-30 ng/mL   Sufficiency           ng/mL   Toxicity               >100 ng/mL      Cholesterol 06/17/2021 187  <200 mg/dL Final    Comment:    Cholesterol Guidelines:      <200  Desirable   200-240  Borderline      >240  Undesirable         HDL 06/17/2021 37* >40 mg/dL Final    Comment:    HDL Guidelines:    <40     Undesirable   40-59    Borderline    >59     Desirable         LDL Cholesterol 06/17/2021 114  0 - 130 mg/dL Final    Comment:    LDL Guidelines:     <100    Desirable   100-129   Near to/above Desirable   130-159   Borderline      >159   Undesirable     Direct (measured) LDL and calculated LDL are not interchangeable tests.  Chol/HDL Ratio 06/17/2021 5.1* <5 Final            Triglycerides 06/17/2021 182* <150 mg/dL Final    Comment:    Triglyceride Guidelines:     <150   Desirable   150-199  Borderline   200-499  High     >499   Very high   Based on AHA Guidelines for fasting triglyceride, October 2012.          VLDL 06/17/2021 NOT REPORTED* 1 - 30 mg/dL Final         Most recent labs reviewed:     Lab Results   Component Value Date WBC 8.1 06/17/2021    HGB 14.4 06/17/2021    HCT 42.1 06/17/2021    MCV 87.1 06/17/2021     06/17/2021       @BRIEFLAB(NA,K,CL,CO2,BUN,CREATININE,GLUCOSE,CALCIUM)@     Lab Results   Component Value Date    ALT 23 06/17/2021    AST 28 06/17/2021    ALKPHOS 63 06/17/2021    BILITOT 0.43 06/17/2021       Lab Results   Component Value Date    TSH 2.33 06/17/2021       Lab Results   Component Value Date    CHOL 187 06/17/2021    CHOL 164 09/17/2019    CHOL 166 04/05/2018     Lab Results   Component Value Date    TRIG 182 (H) 06/17/2021    TRIG 135 09/17/2019    TRIG 256 (H) 04/05/2018     Lab Results   Component Value Date    HDL 37 (L) 06/17/2021    HDL 41 09/17/2019    HDL 41 04/05/2018     Lab Results   Component Value Date    LDLCHOLESTEROL 114 06/17/2021    LDLCHOLESTEROL 96 09/17/2019    LDLCHOLESTEROL 74 04/05/2018     Lab Results   Component Value Date    VLDL NOT REPORTED (H) 06/17/2021    VLDL NOT REPORTED 09/17/2019    VLDL NOT REPORTED 04/05/2018     Lab Results   Component Value Date    CHOLHDLRATIO 5.1 (H) 06/17/2021    CHOLHDLRATIO 4.0 09/17/2019    CHOLHDLRATIO 4.0 04/05/2018       Lab Results   Component Value Date    LABA1C 8.1 (H) 06/17/2021       No results found for: [id number]    No results found for: FOLATE    No results found for: IRON, TIBC, FERRITIN    Lab Results   Component Value Date    VITD25 15.7 (L) 06/17/2021             Current Outpatient Medications   Medication Sig Dispense Refill    venlafaxine (EFFEXOR XR) 75 MG extended release capsule TAKE 1 CAPSULE BY MOUTH EVERY DAY  30 capsule 0    meloxicam (MOBIC) 15 MG tablet TAKE 1 TABLET BY MOUTH EVERY DAY AS NEEDED FOR back PAIN 60 tablet 0    ketoconazole (NIZORAL) 2 % cream Apply topically  2 times a day.  1 Tube 1    metFORMIN (GLUCOPHAGE-XR) 500 MG extended release tablet Take 1 tablet by mouth 2 times daily (before meals) 180 tablet 3    blood glucose monitor kit and supplies Test one time a day & as needed for symptoms of irregular blood glucose. 1 kit 0    blood glucose monitor strips Testing once a day. Please dispense according to patients insurance. 100 strip 3    Lancets MISC Testing once a day. Please dispense according to patients insurance. 100 each 3    fluticasone (FLONASE) 50 MCG/ACT nasal spray INHALE 2 SPRAYS IN EACH NOSTRIL ONE TIME A DAY  16 g 0    lisinopril (PRINIVIL;ZESTRIL) 10 MG tablet Take 1 tablet by mouth daily 90 tablet 1    venlafaxine (EFFEXOR XR) 150 MG extended release capsule TAKE 1 CAPSULE BY MOUTH ONE TIME A DAY ALONG WITH 75 MG CAPSULE 90 capsule 3    tiZANidine (ZANAFLEX) 4 MG tablet TAKE 1 TABLET BY MOUTH ONE TIME A DAY AS NEEDED FOR LOW BACK PAIN. 30 tablet 0    traZODone (DESYREL) 100 MG tablet       Elastic Bandages & Supports (WRIST BRACE/LEFT MEDIUM) MISC USE DAILY FOR HAND PAIN 1 each 0    Elastic Bandages & Supports (WRIST BRACE/RIGHT MEDIUM) MISC USE DAILY FOR HAND PAIN 1 each 0    risperiDONE (RISPERDAL) 2 MG tablet TAKE 1 TABLET BY MOUTH AT BEDTIME  2    amphetamine-dextroamphetamine (ADDERALL) 7.5 MG tablet TAKE 1 TABLET BY MOUTH TWO TIMES A DAY  0    Cholecalciferol (VITAMIN D3) 5000 units CAPS TAKE 1 CAPSULE BY MOUTH ONE TIME A DAY  1    pregabalin (LYRICA) 150 MG capsule TAKE 1 CAPSULE BY MOUTH TWO TIMES A DAY  60 capsule 0     No current facility-administered medications for this visit.              Social History     Socioeconomic History    Marital status:      Spouse name: Not on file    Number of children: Not on file    Years of education: Not on file    Highest education level: Not on file   Occupational History    Not on file   Tobacco Use    Smoking status: Never Smoker    Smokeless tobacco: Never Used   Vaping Use    Vaping Use: Never used   Substance and Sexual Activity    Alcohol use: No     Alcohol/week: 0.0 standard drinks    Drug use: No    Sexual activity: Yes     Partners: Male   Other Topics Concern    Not on file   Social History Narrative    Not on file     Social Determinants of Health     Financial Resource Strain: Low Risk     Difficulty of Paying Living Expenses: Not hard at all   Food Insecurity: No Food Insecurity    Worried About Running Out of Food in the Last Year: Never true    [address] in the Last Year: Never true   Transportation Needs: No Transportation Needs    Lack of Transportation (Medical): No    Lack of Transportation (Non-Medical): No   Physical Activity:     Days of Exercise per Week:     Minutes of Exercise per Session:    Stress:     Feeling of Stress :    Social Connections:     Frequency of Communication with Friends and Family:     Frequency of Social Gatherings with Friends and Family:     Attends [employer] Services:     Active Member of Clubs or Organizations:     Attends Club or Organization Meetings:     Marital Status:    Intimate Partner Violence:     Fear of Current or Ex-Partner:     Emotionally Abused:     Physically Abused:     Sexually Abused:      Counseling given: Not Answered        Family History   Problem Relation Age of Onset    Arthritis Mother     Depression Mother     High Blood Pressure Mother     Colon Cancer Mother     High Blood Pressure Father     Cervical Cancer Maternal Grandmother     Cancer Paternal Grandmother         melanoma bladder kidney    Cancer Paternal Grandfather         melanoma             -rest of complaints with corresponding details per ROS    The patient's past medical, surgical, social, and family history as well as her current medications and allergies were reviewed as documented intoday's encounter. Review of Systems   Constitutional: Negative for activity change, appetite change, diaphoresis, fatigue and unexpected weight change. HENT: Negative for congestion, hearing loss, mouth sores, postnasal drip and sinus pressure. Eyes: Negative for photophobia and visual disturbance.    Respiratory: Negative for cough, chest tightness, shortness of breath and wheezing. Cardiovascular: Negative for chest pain, palpitations and leg swelling. Gastrointestinal: Negative for abdominal distention, abdominal pain, constipation, diarrhea and nausea. Musculoskeletal: Positive for arthralgias, back pain, myalgias and neck pain. Neurological: Positive for weakness and numbness. Negative for tremors, light-headedness and headaches. Psychiatric/Behavioral: Positive for decreased concentration, dysphoric mood and sleep disturbance. Negative for agitation. The patient is nervous/anxious. The patient is not hyperactive. Physical Exam  Vitals and nursing note reviewed. Constitutional:       Appearance: Normal appearance. She is obese. She is not ill-appearing. HENT:      Head: Normocephalic. Nose: Nose normal.   Eyes:      Pupils: Pupils are equal, round, and reactive to light. Cardiovascular:      Rate and Rhythm: Normal rate and regular rhythm. Heart sounds: Normal heart sounds. Pulmonary:      Effort: Pulmonary effort is normal. No respiratory distress. Breath sounds: Normal breath sounds. No wheezing, rhonchi or rales. Abdominal:      General: Bowel sounds are normal.      Palpations: Abdomen is soft. There is no mass. Tenderness: There is no abdominal tenderness. There is no guarding. Musculoskeletal:      Cervical back: Normal range of motion. No rigidity, spasms, torticollis, tenderness or bony tenderness. Normal range of motion. Thoracic back: Spasms present. Decreased range of motion. No scoliosis. Lumbar back: Spasms and tenderness present. No bony tenderness. Decreased range of motion. Negative right straight leg raise test and negative left straight leg raise test. Scoliosis present. Skin:     General: Skin is warm. Neurological:      Mental Status: She is alert and oriented to person, place, and time. Cranial Nerves: Cranial nerves are intact.       Sensory: No sensory deficit. Motor: No weakness or atrophy. Gait: Gait and tandem walk normal.   Psychiatric:         Attention and Perception: Attention and perception normal.         Mood and Affect: Mood is anxious and depressed. Affect is tearful. Affect is not angry. Speech: She is communicative. Speech is not rapid and pressured. Behavior: Behavior is agitated. Behavior is not slowed, aggressive or withdrawn. Thought Content: Thought content normal.             ASSESSMENT AND PLAN      1. Type 2 diabetes mellitus without complication, without long-term current use of insulin (HCC)  Blood sugars have improved continue Metformin repeat A1c in 3 weeks. - Hemoglobin A1C; Future    2. Essential hypertension  Controlled continue same medications    3. Anxiety and depression  We will fill out FMLA papers continue same medications    4. Major depressive disorder, recurrent, severe without psychotic features ([facility] [address]  Continue counseling continue to follow with psychiatrist.  Discussed with patient I cannot put anything writing not to get any cold vaccine, that is a personal choice but would not do anything writing. 5. Fibromyalgia  Discussed with patient to fill out FMLA papers    6. Class 1 obesity due to excess calories without serious comorbidity with body mass index (BMI) of 33.0 to 33.9 in adult  Improving continue same medications    7. Screening mammogram, encounter for  Schedule mammogram    8. Need for pneumococcal vaccination    - Pneumococcal polysaccharide vaccine 23-valent greater than or equal to 1yo subcutaneous/IM      Orders Placed This Encounter   Procedures    Pneumococcal polysaccharide vaccine 23-valent greater than or equal to 1yo subcutaneous/IM    Hemoglobin A1C     Standing Status:   Future     Standing Expiration Date:   8/24/2022         There are no discontinued medications.     James Zayas received counseling on the following healthy behaviors: nutrition, exercise and medication adherence  Reviewed prior labs and health maintenance  Continue current medications, diet and exercise. Discussed use, benefit, and side effects of prescribed medications. Barriers to medication compliance addressed. Patient given educational materials - see patient instructions  Was a self-tracking handout given in paper form or via [employer]? Yes    Requested Prescriptions      No prescriptions requested or ordered in this encounter       All patient questions answered. Patient voiced understanding. Quality Measures    Body mass index is 32.6 kg/m². Elevated. Weight control planned discussed daily exercise regimen and Healthy diet and regular exercise. BP: 130/80 Blood pressure is normal. Treatment plan consists of Weight Reduction, DASH Eating Plan, Dietary Sodium Restriction, Increased Physical Activity and No treatment change needed. Lab Results   Component Value Date    LDLCHOLESTEROL 114 06/17/2021    (goal LDL reduction with dx if diabetes is 50% LDL reduction)      PHQ Scores 6/17/2021 5/5/2021 3/17/2021 8/18/2020 9/10/2019 10/29/2018 1/31/2018   PHQ2 Score 3 6 1 2 1 6 5   PHQ9 Score 8 18 1 2 1 13 22     Interpretation of Total Score Depression Severity: 1-4 = Minimal depression, 5-9 = Mild depression, 10-14 = Moderate depression, 15-19 = Moderately severe depression, 20-27 = Severe depression    The patient'spast medical, surgical, social, and family history as well as her   current medications and allergies were reviewed as documented in today's encounter. Medications, labs, diagnostic studies, consultations andfollow-up as documented in this encounter. Return in about 3 months (around 11/24/2021). Patient wasseen with total face to face time of 30 minutes. More than 50% of this visit was counseling and education.        Future Appointments   Date Time Provider Peggy Tineo   10/18/2021 10:15 AM MD anne Guerra   11/30/2021  8:30 AM MD anne Guerra [facility] This note was completed by using the assistance of a speech-recognition program. However, inadvertent computerized transcription errors may be present. Althoughevery effort was made to ensure accuracy, no guarantees can be provided that every mistake has been identified and corrected by editing.   Electronically signed by Gabe Boxer, MD on 8/24/2021  1:28 PM

## 2021-08-26 ENCOUNTER — TELEPHONE (OUTPATIENT)
Dept: FAMILY MEDICINE CLINIC | Age: 41
End: 2021-08-26

## 2021-08-26 NOTE — TELEPHONE ENCOUNTER
Attempted to call pt to find out where she would like her [facility] papers faxed. Left them in brown folder at .

## 2021-09-05 DIAGNOSIS — M54.41 CHRONIC MIDLINE LOW BACK PAIN WITH RIGHT-SIDED SCIATICA: ICD-10-CM

## 2021-09-05 DIAGNOSIS — G89.29 CHRONIC MIDLINE LOW BACK PAIN WITH RIGHT-SIDED SCIATICA: ICD-10-CM

## 2021-09-05 DIAGNOSIS — F33.41 RECURRENT MAJOR DEPRESSIVE DISORDER, IN PARTIAL REMISSION (HCC): ICD-10-CM

## 2021-09-05 DIAGNOSIS — I10 ESSENTIAL HYPERTENSION: ICD-10-CM

## 2021-09-07 RX ORDER — LISINOPRIL 10 MG/1
TABLET ORAL
Qty: 90 TABLET | Refills: 0 | Status: SHIPPED | OUTPATIENT
Start: 2021-09-07 | End: 2022-01-12

## 2021-09-07 RX ORDER — MELOXICAM 15 MG/1
TABLET ORAL
Qty: 60 TABLET | Refills: 0 | Status: SHIPPED | OUTPATIENT
Start: 2021-09-07 | End: 2021-12-16

## 2021-09-07 RX ORDER — VENLAFAXINE HYDROCHLORIDE 75 MG/1
CAPSULE, EXTENDED RELEASE ORAL
Qty: 30 CAPSULE | Refills: 0 | Status: SHIPPED | OUTPATIENT
Start: 2021-09-07 | End: 2021-10-07

## 2021-09-14 DIAGNOSIS — M54.41 CHRONIC MIDLINE LOW BACK PAIN WITH RIGHT-SIDED SCIATICA: ICD-10-CM

## 2021-09-14 DIAGNOSIS — G89.29 CHRONIC MIDLINE LOW BACK PAIN WITH RIGHT-SIDED SCIATICA: ICD-10-CM

## 2021-09-14 DIAGNOSIS — M25.551 PAIN OF BOTH HIP JOINTS: ICD-10-CM

## 2021-09-14 DIAGNOSIS — M25.552 PAIN OF BOTH HIP JOINTS: ICD-10-CM

## 2021-09-14 DIAGNOSIS — M79.7 FIBROMYALGIA: ICD-10-CM

## 2021-09-14 RX ORDER — PREGABALIN 150 MG/1
CAPSULE ORAL
Qty: 60 CAPSULE | Refills: 0 | Status: SHIPPED | OUTPATIENT
Start: 2021-09-14 | End: 2022-04-21

## 2021-09-14 NOTE — TELEPHONE ENCOUNTER
Please Approve or Refuse.   Send to Pharmacy per Pt's Request:      Next Visit Date:  10/18/2021   Last Visit Date: 8/24/2021    Hemoglobin A1C (%)   Date Value   06/17/2021 8.1 (H)   09/17/2019 5.9   09/17/2018 5.8             ( goal A1C is < 7)   BP Readings from Last 3 Encounters:   08/24/21 130/80   06/17/21 120/84   03/17/21 (!) 136/100          (goal 120/80)  BUN   Date Value Ref Range Status   06/17/2021 10 6 - 20 mg/dL Final     CREATININE   Date Value Ref Range Status   06/17/2021 0.71 0.50 - 0.90 mg/dL Final     Potassium   Date Value Ref Range Status   06/17/2021 3.9 3.7 - 5.3 mmol/L Final

## 2021-10-07 DIAGNOSIS — F33.41 RECURRENT MAJOR DEPRESSIVE DISORDER, IN PARTIAL REMISSION (HCC): ICD-10-CM

## 2021-10-07 RX ORDER — VENLAFAXINE HYDROCHLORIDE 75 MG/1
CAPSULE, EXTENDED RELEASE ORAL
Qty: 30 CAPSULE | Refills: 0 | Status: SHIPPED | OUTPATIENT
Start: 2021-10-07 | End: 2021-12-16

## 2021-11-13 DIAGNOSIS — F33.41 RECURRENT MAJOR DEPRESSIVE DISORDER, IN PARTIAL REMISSION (HCC): ICD-10-CM

## 2021-11-15 RX ORDER — VENLAFAXINE HYDROCHLORIDE 150 MG/1
CAPSULE, EXTENDED RELEASE ORAL
Qty: 90 CAPSULE | Refills: 0 | Status: SHIPPED | OUTPATIENT
Start: 2021-11-15 | End: 2022-02-14

## 2021-11-15 NOTE — TELEPHONE ENCOUNTER
Please Approve or Refuse.   Send to Pharmacy per Pt's Request:      Next Visit Date:  11/30/2021   Last Visit Date: 8/24/2021    Hemoglobin A1C (%)   Date Value   06/17/2021 8.1 (H)   09/17/2019 5.9   09/17/2018 5.8             ( goal A1C is < 7)   BP Readings from Last 3 Encounters:   08/24/21 130/80   06/17/21 120/84   03/17/21 (!) 136/100          (goal 120/80)  BUN   Date Value Ref Range Status   06/17/2021 10 6 - 20 mg/dL Final     CREATININE   Date Value Ref Range Status   06/17/2021 0.71 0.50 - 0.90 mg/dL Final     Potassium   Date Value Ref Range Status   06/17/2021 3.9 3.7 - 5.3 mmol/L Final

## 2021-12-15 DIAGNOSIS — G89.29 CHRONIC MIDLINE LOW BACK PAIN WITH RIGHT-SIDED SCIATICA: ICD-10-CM

## 2021-12-15 DIAGNOSIS — M54.41 CHRONIC MIDLINE LOW BACK PAIN WITH RIGHT-SIDED SCIATICA: ICD-10-CM

## 2021-12-15 DIAGNOSIS — F33.41 RECURRENT MAJOR DEPRESSIVE DISORDER, IN PARTIAL REMISSION (HCC): ICD-10-CM

## 2021-12-16 RX ORDER — VENLAFAXINE HYDROCHLORIDE 75 MG/1
CAPSULE, EXTENDED RELEASE ORAL
Qty: 30 CAPSULE | Refills: 0 | Status: SHIPPED | OUTPATIENT
Start: 2021-12-16 | End: 2021-12-17

## 2021-12-16 RX ORDER — MELOXICAM 15 MG/1
TABLET ORAL
Qty: 60 TABLET | Refills: 0 | Status: SHIPPED | OUTPATIENT
Start: 2021-12-16 | End: 2022-02-14

## 2021-12-17 DIAGNOSIS — F33.41 RECURRENT MAJOR DEPRESSIVE DISORDER, IN PARTIAL REMISSION (HCC): ICD-10-CM

## 2021-12-17 RX ORDER — VENLAFAXINE HYDROCHLORIDE 75 MG/1
CAPSULE, EXTENDED RELEASE ORAL
Qty: 30 CAPSULE | Refills: 0 | Status: SHIPPED | OUTPATIENT
Start: 2021-12-17 | End: 2022-02-14

## 2021-12-17 NOTE — TELEPHONE ENCOUNTER
Please Approve or Refuse.   Send to Pharmacy per Pt's Request:     Next Visit Date:  Visit date not found   Last Visit Date: 8/24/2021    Hemoglobin A1C (%)   Date Value   06/17/2021 8.1 (H)   09/17/2019 5.9   09/17/2018 5.8             ( goal A1C is < 7)   BP Readings from Last 3 Encounters:   08/24/21 130/80   06/17/21 120/84   03/17/21 (!) 136/100          (goal 120/80)  BUN   Date Value Ref Range Status   06/17/2021 10 6 - 20 mg/dL Final     CREATININE   Date Value Ref Range Status   06/17/2021 0.71 0.50 - 0.90 mg/dL Final     Potassium   Date Value Ref Range Status   06/17/2021 3.9 3.7 - 5.3 mmol/L Final

## 2022-01-12 DIAGNOSIS — I10 ESSENTIAL HYPERTENSION: ICD-10-CM

## 2022-01-12 RX ORDER — LISINOPRIL 10 MG/1
TABLET ORAL
Qty: 90 TABLET | Refills: 0 | Status: SHIPPED | OUTPATIENT
Start: 2022-01-12 | End: 2022-05-18

## 2022-02-13 DIAGNOSIS — F33.41 RECURRENT MAJOR DEPRESSIVE DISORDER, IN PARTIAL REMISSION (HCC): ICD-10-CM

## 2022-02-13 DIAGNOSIS — G89.29 CHRONIC MIDLINE LOW BACK PAIN WITH RIGHT-SIDED SCIATICA: ICD-10-CM

## 2022-02-13 DIAGNOSIS — M54.41 CHRONIC MIDLINE LOW BACK PAIN WITH RIGHT-SIDED SCIATICA: ICD-10-CM

## 2022-02-14 RX ORDER — MELOXICAM 15 MG/1
TABLET ORAL
Qty: 60 TABLET | Refills: 0 | Status: SHIPPED | OUTPATIENT
Start: 2022-02-14 | End: 2022-05-18

## 2022-02-14 RX ORDER — VENLAFAXINE HYDROCHLORIDE 150 MG/1
CAPSULE, EXTENDED RELEASE ORAL
Qty: 90 CAPSULE | Refills: 0 | Status: SHIPPED | OUTPATIENT
Start: 2022-02-14 | End: 2022-05-16

## 2022-02-14 RX ORDER — VENLAFAXINE HYDROCHLORIDE 75 MG/1
CAPSULE, EXTENDED RELEASE ORAL
Qty: 30 CAPSULE | Refills: 0 | Status: SHIPPED | OUTPATIENT
Start: 2022-02-14 | End: 2022-03-01

## 2022-03-01 DIAGNOSIS — F33.41 RECURRENT MAJOR DEPRESSIVE DISORDER, IN PARTIAL REMISSION (HCC): ICD-10-CM

## 2022-03-01 RX ORDER — VENLAFAXINE HYDROCHLORIDE 75 MG/1
CAPSULE, EXTENDED RELEASE ORAL
Qty: 30 CAPSULE | Refills: 0 | Status: SHIPPED | OUTPATIENT
Start: 2022-03-01 | End: 2022-03-21

## 2022-03-01 NOTE — TELEPHONE ENCOUNTER
Please Approve or Refuse.   Send to Pharmacy per Pt's Request: Joselito Juárez     Next Visit Date:  3/22/2022   Last Visit Date: 8/24/2021    Hemoglobin A1C (%)   Date Value   06/17/2021 8.1 (H)   09/17/2019 5.9   09/17/2018 5.8             ( goal A1C is < 7)   BP Readings from Last 3 Encounters:   08/24/21 130/80   06/17/21 120/84   03/17/21 (!) 136/100          (goal 120/80)  BUN   Date Value Ref Range Status   06/17/2021 10 6 - 20 mg/dL Final     CREATININE   Date Value Ref Range Status   06/17/2021 0.71 0.50 - 0.90 mg/dL Final     Potassium   Date Value Ref Range Status   06/17/2021 3.9 3.7 - 5.3 mmol/L Final

## 2022-03-19 DIAGNOSIS — F33.41 RECURRENT MAJOR DEPRESSIVE DISORDER, IN PARTIAL REMISSION (HCC): ICD-10-CM

## 2022-03-21 RX ORDER — VENLAFAXINE HYDROCHLORIDE 75 MG/1
CAPSULE, EXTENDED RELEASE ORAL
Qty: 30 CAPSULE | Refills: 0 | Status: SHIPPED | OUTPATIENT
Start: 2022-03-21 | End: 2022-05-16

## 2022-03-21 NOTE — TELEPHONE ENCOUNTER
Please Approve or Refuse.   Send to Pharmacy per Pt's Request: Lizbet Tracy     Next Visit Date:  3/22/2022   Last Visit Date: 8/24/2021    Hemoglobin A1C (%)   Date Value   06/17/2021 8.1 (H)   09/17/2019 5.9   09/17/2018 5.8             ( goal A1C is < 7)   BP Readings from Last 3 Encounters:   08/24/21 130/80   06/17/21 120/84   03/17/21 (!) 136/100          (goal 120/80)  BUN   Date Value Ref Range Status   06/17/2021 10 6 - 20 mg/dL Final     CREATININE   Date Value Ref Range Status   06/17/2021 0.71 0.50 - 0.90 mg/dL Final     Potassium   Date Value Ref Range Status   06/17/2021 3.9 3.7 - 5.3 mmol/L Final

## 2022-04-14 DIAGNOSIS — G89.29 CHRONIC MIDLINE LOW BACK PAIN WITH RIGHT-SIDED SCIATICA: ICD-10-CM

## 2022-04-14 DIAGNOSIS — M54.41 CHRONIC MIDLINE LOW BACK PAIN WITH RIGHT-SIDED SCIATICA: ICD-10-CM

## 2022-04-14 DIAGNOSIS — I10 ESSENTIAL HYPERTENSION: ICD-10-CM

## 2022-04-14 RX ORDER — MELOXICAM 15 MG/1
TABLET ORAL
Qty: 60 TABLET | Refills: 0 | OUTPATIENT
Start: 2022-04-14

## 2022-04-14 RX ORDER — LISINOPRIL 10 MG/1
TABLET ORAL
Qty: 90 TABLET | Refills: 0 | OUTPATIENT
Start: 2022-04-14

## 2022-04-18 DIAGNOSIS — G89.29 CHRONIC MIDLINE LOW BACK PAIN WITH RIGHT-SIDED SCIATICA: ICD-10-CM

## 2022-04-18 DIAGNOSIS — M54.41 CHRONIC MIDLINE LOW BACK PAIN WITH RIGHT-SIDED SCIATICA: ICD-10-CM

## 2022-04-18 DIAGNOSIS — I10 ESSENTIAL HYPERTENSION: ICD-10-CM

## 2022-04-18 RX ORDER — LISINOPRIL 10 MG/1
TABLET ORAL
Qty: 90 TABLET | Refills: 0 | OUTPATIENT
Start: 2022-04-18

## 2022-04-18 RX ORDER — MELOXICAM 15 MG/1
TABLET ORAL
Qty: 60 TABLET | Refills: 0 | OUTPATIENT
Start: 2022-04-18

## 2022-04-20 DIAGNOSIS — I10 ESSENTIAL HYPERTENSION: ICD-10-CM

## 2022-04-20 DIAGNOSIS — M54.41 CHRONIC MIDLINE LOW BACK PAIN WITH RIGHT-SIDED SCIATICA: ICD-10-CM

## 2022-04-20 DIAGNOSIS — G89.29 CHRONIC MIDLINE LOW BACK PAIN WITH RIGHT-SIDED SCIATICA: ICD-10-CM

## 2022-04-20 RX ORDER — ALBUTEROL SULFATE 90 UG/1
1 AEROSOL, METERED RESPIRATORY (INHALATION) EVERY 8 HOURS
COMMUNITY
Start: 2022-01-25 | End: 2022-04-21 | Stop reason: ALTCHOICE

## 2022-04-20 RX ORDER — NORETHINDRONE 0.35 MG
1 KIT ORAL DAILY
COMMUNITY
Start: 2022-04-18

## 2022-04-20 RX ORDER — LISINOPRIL 10 MG/1
TABLET ORAL
Qty: 90 TABLET | Refills: 0 | OUTPATIENT
Start: 2022-04-20

## 2022-04-20 RX ORDER — MELOXICAM 15 MG/1
TABLET ORAL
Qty: 60 TABLET | Refills: 0 | OUTPATIENT
Start: 2022-04-20

## 2022-04-20 NOTE — PROGRESS NOTES
[address]  [address]  [address] [place] [id number]  Dept: [id number] [facility] Shabbir (:  1980) is a 40 y.o. female. Patient is here for evaluation of the following chief complaint(s):  Chief Complaint   Patient presents with    Carpal Tunnel     both wrists, worse in the right wrist     Diabetes     pt refused a1c        SUBJECTIVE/OBJECTIVE:  HPI  Tamiko Melendez is a 40 y.o. female patient. Patient is an established patient of Dr. Sandra Styles. Patient has a known history of syndrome on the right wrist, hypertension,. Diabetic, low back pain, fibromyalgia, and obesity. CTR  Patient reported some worsening pain on her right hand. She has been having some issues for several years but has been having intermittent pain for a while but in the past several months she has been having worsening pain she is right hand dominant. She is also noticing some worsening problems with grasping. Also noticed some frequent dropping of things since last year. Works as an LPN. She was given a splint last year and was referred to an orthopedic specialist but was told that she needed an EMG done before she gets seen by the orthopedic specialist and this was not done at that time. We will go ahead and get this ordered today she is to continue using the splint especially at night. She is in moderate to severe pain today. HYPERTENSION  Tamiko Melendez has a well controlled hypertension. she is currently on lisinopril. Patient's most recent BP in the office was stable. she reports stable BP readings at home. Patient denies any adverse reaction to this therapy. she denies any CP, SOB, HA, or palpitations. Patient admits to exercising and adheres to low salt diet. No history of organ damage due to condition noted.   Lab Results   Component Value Date/Time    CREATININE 0.71 2021 07:20 AM     DIABETES MELLITUS--patient declined to get her A1c done today  Patient has a  unstable Diabetes Mellitus. Current therapy includes metformin. Patient is responding well with this therapy. Patient reports home glucose monitoring as Stable readings. Patient denieshypoglycemia episodes such as{symptoms. Patient denies diarrhea. Lab Results   Component Value Date/Time    LABA1C 8.1 (H) 06/17/2021 07:20 AM    LABA1C 5.9 09/17/2019 09:53 AM      LOW BACK PAIN/ FIBROMYALGIA  Patient has a known and fibromyalgia and chronic low back pain she is established with pain management and is currently on meloxicam.  This medications is not helping her carpal tunnel at this time. Therefore, we will go ahead and start her on a Medrol pack to help with appetite information. INSOMNIA  Patient is also complaining of some insomnia that she has had for a while despite being on the trazodone which was started by her psychiatric specialist at [facility].  Patient continues to have this problem patient is encouraged to discuss this further. Since this is really not an appropriate thing for us to adjust her medications since she is seeing the specialist for this current problem. DEPRESSION AND ANXIETY  Carter Daniels reported some ongoing issues with depression and anxiety. She had an elevated PHQ-9 score of 17. Patient appears to be tearful during the visit. We started to discuss this today. Patient states that this is mostly due to her current health problems. She is aware that most of her medications are being adjusted by her psychiatrist at [facility] and she is also aware that this should also be done by her psychiatrist.  She does have an upcoming appointment in the next few weeks. She did not want to be started on some adjunctive therapy such as buspirone and hydroxyzine since she has been on this in the past that did not work as well. Patient contracts to safety.   .  Current therapy includes Effexor- Buspirone did not work, hydroxizine did not work, which is working well for her. she denies adverse reaction to current therapy. she also denies suicidal/homicidal ideation, plan or intent. Patient being seen by [facility] every three months . PHQ-2 Over the past 2 weeks, how often have you been bothered by any of the following problems? Little interest or pleasure in doing things: Nearly every day  Feeling down, depressed, or hopeless: Nearly every day  PHQ-2 Score: 6  PHQ-9 Over the past 2 weeks, how often have you been bothered by any of the following problems? Trouble falling or staying asleep, or sleeping too much: More than half the days  Feeling tired or having little energy: Nearly every day  Poor appetite or overeating: Not at all  Feeling bad about yourself - or that you are a failure or have let yourself or your family down: Nearly every day  Trouble concentrating on things, such as reading the newspaper or watching television: Nearly every day  Moving or speaking so slowly that other people could have noticed. Or the opposite - being so fidgety or restless that you have been moving around a lot more than usual: Not at all  Thoughts that you would be better off dead, or of hurting yourself in some way: Not at all  If you checked off any problems, how difficult have these problems made it for you to do your work, take care of things at home, or get along with other people?: Very difficult  PHQ-9 Total Score: 17  PHQ-9 Total Score: 17   No flowsheet data found. DEPRESSION SCREENING:   PHQ Scores 4/21/2022 6/17/2021 5/5/2021 3/17/2021 8/18/2020 9/10/2019 10/29/2018   PHQ2 Score 6 3 6 1 2 1 6   PHQ9 Score 17 8 18 1 2 1 13     VITAL SIGNS:  Vitals:    04/21/22 1024   BP: 120/88   Pulse: 92   Temp: 97.2 °F (36.2 °C)   SpO2: 98%   Weight: 197 lb 9.6 oz (89.6 kg)   Height: 5' 6\" (1.676 m)   Estimated body mass index is 31.89 kg/m² as calculated from the following:    Height as of this encounter: 5' 6\" (1.676 m).     Weight as of this encounter: 197 lb 9.6 oz (89.6 kg). Review of Systems   Constitutional: Positive for activity change. Negative for chills and fever. HENT: Negative. Respiratory: Negative. Negative for shortness of breath and wheezing. Cardiovascular: Negative. Negative for chest pain and palpitations. Gastrointestinal: Negative for abdominal pain. Endocrine: Negative. Musculoskeletal: Positive for arthralgias, back pain, gait problem, joint swelling and myalgias. Low back, general body aches, right hand and elbow   Neurological: Positive for weakness (right hand) and numbness (right hand). Negative for headaches. Psychiatric/Behavioral: Negative for sleep disturbance and suicidal ideas. The patient is nervous/anxious. Physical Exam  Vitals and nursing note reviewed. Constitutional:       Appearance: Normal appearance. She is obese. HENT:      Head: Normocephalic. Nose: Nose normal.   Cardiovascular:      Rate and Rhythm: Normal rate and regular rhythm. Pulmonary:      Effort: Pulmonary effort is normal.      Breath sounds: Normal breath sounds. Abdominal:      General: Abdomen is protuberant. Comments: obese   Skin:     General: Skin is warm and dry. Neurological:      Mental Status: She is alert and oriented to person, place, and time. Sensory: Sensory deficit present. Motor: Weakness present. Comments: Right wrist  Positive tinel's sign, Phalen's sign   Psychiatric:         Mood and Affect: Mood is anxious. Affect is tearful. Speech: Speech is rapid and pressured.          MEDICAL HISTORY      Diagnosis Date    Anxiety and depression     Chronic back pain     Chronic low back pain with right-sided sciatica     Class 1 obesity due to excess calories without serious comorbidity with body mass index (BMI) of 33.0 to 33.9 in adult 8/6/2019    Depression     Essential hypertension 9/17/2018    Fibromyalgia 10/8/2019      MEDICATIONS  Prior to Visit Medications    Medication Sig Taking? Authorizing Provider   methylPREDNISolone (MEDROL DOSEPACK) 4 MG tablet Take by mouth. Yes Tl Campa, APRN - CNP   DEBLITANE 0.35 MG tablet Take 1 tablet by mouth daily Yes Historical Provider, MD   venlafaxine (EFFEXOR XR) 75 MG extended release capsule TAKE 1 CAPSULE BY MOUTH EVERY DAY Yes Zaria Hernandez MD   venlafaxine (EFFEXOR XR) 150 MG extended release capsule TAKE 1 CAPSULE BY MOUTH EVERY DAY WITH 75MG CAPSULE Yes Zaria Hernandez MD   meloxicam (MOBIC) 15 MG tablet TAKE 1 TABLET BY MOUTH EVERY DAY as needed for back pain Yes Zaria Hernandez MD   lisinopril (PRINIVIL;ZESTRIL) 10 MG tablet TAKE 1 TABLET BY MOUTH EVERY DAY Yes Zaria Hernandez MD   metFORMIN (GLUCOPHAGE-XR) 500 MG extended release tablet Take 1 tablet by mouth 2 times daily (before meals) Yes Zaria Hernandez MD   blood glucose monitor kit and supplies Test one time a day & as needed for symptoms of irregular blood glucose. Yes Zaria Hernandez MD   blood glucose monitor strips Testing once a day. Please dispense according to patients insurance. Yes Zaria Hernandez MD   Lancets MISC Testing once a day. Please dispense according to patients insurance. Yes Zaria Hernandez MD   tiZANidine (ZANAFLEX) 4 MG tablet TAKE 1 TABLET BY MOUTH ONE TIME A DAY AS NEEDED FOR LOW BACK PAIN.  Yes Zaria Hernandez MD   traZODone (DESYREL) 100 MG tablet  Yes Historical Provider, MD   Elastic Bandages & Supports (WRIST BRACE/LEFT MEDIUM) MISC USE DAILY FOR HAND PAIN Yes Zaria Hernandez MD   Elastic Bandages & Supports (WRIST BRACE/RIGHT MEDIUM) MISC USE DAILY FOR HAND PAIN Yes Zaria Hernandez MD   amphetamine-dextroamphetamine (ADDERALL) 7.5 MG tablet TAKE 1 TABLET BY MOUTH TWO TIMES A DAY Yes Historical Provider, MD   Cholecalciferol (VITAMIN D3) 5000 units CAPS TAKE 1 CAPSULE BY MOUTH ONE TIME A DAY Yes Historical Provider, MD     Controlled Substance Monitoring:  Acute and Chronic Pain Monitoring:   RX Monitoring 4/20/2022 Attestation -   Acute Pain Prescriptions Severe pain not adequately treated with lower dose. Periodic Controlled Substance Monitoring No signs of potential drug abuse or diversion identified. ASSESSMENT/PLAN:  1. Carpal tunnel syndrome of right wrist  Worsening  Take prednisone with food for pain. DISCUSSED and ADVISED TO:  Use ice pack off and on. Reduce activity that causes too much pain. Avoid frequent twisting and bending of wrist.  Take frequent breaks and stretch as discussed. Use wrist splint  as much as you can. Report for worsening symptoms              - EMG; Future  - methylPREDNISolone (MEDROL DOSEPACK) 4 MG tablet; Take by mouth. Dispense: 1 kit; Refill: 0  - [facility], MD, Orthopedic Surgery, [place]    2. Essential hypertension  Stable  Continue current therapy. DISCUSSED AND ADVISED TO:  Cut down on your salt intake. Cut down on caffeinated drinks, sports drinks. Instructed to check BP at home regularly. Report for any chest pains, shortness of breath, headaches, and lightheadedness. Call the office if your blood pressure continue to be higher than 140/90 or 90/50. 3. Type 2 diabetes mellitus without complication, without long-term current use of insulin (HCC)  Stable  Continue therapy. We will continue to monitor Hemoglobin A1c   DISCUSSED and ADVISED TO:  Continue to check Glucose levels at home. Report increased and low levels as discussed. Decrease carbohydrates, sugary drinks, desserts in your diet. Exercise regularly, as tolerated. Try to lose weight. 4. Chronic midline low back pain with right-sided sciatica  Failure to Improve  Continue current therapy. DISCUSSED AND ADVISED TO:  Use heat packs 15 to 20 mins every 2-3 hours. Do some back stretches as tolerated. Refer to hand out for instructions. Call for worsening, numbness, weakness. 5. Fibromyalgia  Failure to Improve  DISCUSSED AND ADVISED TO:  Exercise often.   Get a good night's sleep. Make healthy changes to diet. Try to reduce stress  Carefully take medications as discussed  Report for worsening symptoms            6. Adjustment insomnia  Failure to Improve  Continue current routine or therapy. DISCUSSED AND ADVISED TO:  Cut down intake of drinks with caffeine, such as coffee or black tea, for 8 hours before bed. Cut down on smoking or use other types of tobacco near bedtime. Cut down on Nicotine and alcohol   Stop eating a big meal close to bedtime. Stop drinking a lot of  fluids close to bedtime. 7. Class 1 obesity due to excess calories with serious comorbidity and body mass index (BMI) of 31.0 to 31.9 in adult  Failure to Improve  BMI increasing  DISCUSSED AND ADVISED TO:  Eat a low-fat and low carbohydrates diet. Avoid fried foods especially fast food. Choose healthier options for snacks. Have 5-6 servings of fruits and vegetables per day. Cut down on eating processed food. Add 30 minutes to 1 hour aerobic exercise for 3-4 days a week. On this date 4/21/2022 I have spent 35 minutes reviewing previous notes, test results and face to face with the patient discussing the diagnosis and importance of compliance with the treatment plan as well as documenting on the day of the visit. Return in about 4 weeks (around 5/19/2022) for Appt w/ Dr. Andi Rocha, Chronic conditions. This note was completed by using the assistance of a speech-recognition program. However, inadvertent computerized transcription errors may be present. Although every effort was made to ensure accuracy, no guarantees can be provided that every mistake has been identified and corrected by editing.   Electronically signed by STEVE Zeng CNP on 4/20/22 at 7:41 PM EDT     --STEVE Zeng CNP

## 2022-04-21 ENCOUNTER — OFFICE VISIT (OUTPATIENT)
Dept: FAMILY MEDICINE CLINIC | Age: 42
End: 2022-04-21
Payer: COMMERCIAL

## 2022-04-21 VITALS
OXYGEN SATURATION: 98 % | DIASTOLIC BLOOD PRESSURE: 88 MMHG | HEIGHT: 66 IN | TEMPERATURE: 97.2 F | WEIGHT: 197.6 LBS | HEART RATE: 92 BPM | BODY MASS INDEX: 31.76 KG/M2 | SYSTOLIC BLOOD PRESSURE: 120 MMHG

## 2022-04-21 DIAGNOSIS — M79.7 FIBROMYALGIA: ICD-10-CM

## 2022-04-21 DIAGNOSIS — F51.02 ADJUSTMENT INSOMNIA: ICD-10-CM

## 2022-04-21 DIAGNOSIS — E66.09 CLASS 1 OBESITY DUE TO EXCESS CALORIES WITH SERIOUS COMORBIDITY AND BODY MASS INDEX (BMI) OF 31.0 TO 31.9 IN ADULT: ICD-10-CM

## 2022-04-21 DIAGNOSIS — G89.29 CHRONIC MIDLINE LOW BACK PAIN WITH RIGHT-SIDED SCIATICA: ICD-10-CM

## 2022-04-21 DIAGNOSIS — M54.41 CHRONIC MIDLINE LOW BACK PAIN WITH RIGHT-SIDED SCIATICA: ICD-10-CM

## 2022-04-21 DIAGNOSIS — E11.9 TYPE 2 DIABETES MELLITUS WITHOUT COMPLICATION, WITHOUT LONG-TERM CURRENT USE OF INSULIN (HCC): ICD-10-CM

## 2022-04-21 DIAGNOSIS — G56.01 CARPAL TUNNEL SYNDROME OF RIGHT WRIST: Primary | ICD-10-CM

## 2022-04-21 DIAGNOSIS — I10 ESSENTIAL HYPERTENSION: ICD-10-CM

## 2022-04-21 PROCEDURE — 99214 OFFICE O/P EST MOD 30 MIN: CPT | Performed by: FAMILY MEDICINE

## 2022-04-21 RX ORDER — METHYLPREDNISOLONE 4 MG/1
TABLET ORAL
Qty: 1 KIT | Refills: 0 | Status: SHIPPED | OUTPATIENT
Start: 2022-04-21

## 2022-04-21 ASSESSMENT — PATIENT HEALTH QUESTIONNAIRE - PHQ9
8. MOVING OR SPEAKING SO SLOWLY THAT OTHER PEOPLE COULD HAVE NOTICED. OR THE OPPOSITE, BEING SO FIGETY OR RESTLESS THAT YOU HAVE BEEN MOVING AROUND A LOT MORE THAN USUAL: 0
SUM OF ALL RESPONSES TO PHQ9 QUESTIONS 1 & 2: 6
3. TROUBLE FALLING OR STAYING ASLEEP: 2
9. THOUGHTS THAT YOU WOULD BE BETTER OFF DEAD, OR OF HURTING YOURSELF: 0
SUM OF ALL RESPONSES TO PHQ QUESTIONS 1-9: 17
SUM OF ALL RESPONSES TO PHQ QUESTIONS 1-9: 17
10. IF YOU CHECKED OFF ANY PROBLEMS, HOW DIFFICULT HAVE THESE PROBLEMS MADE IT FOR YOU TO DO YOUR WORK, TAKE CARE OF THINGS AT HOME, OR GET ALONG WITH OTHER PEOPLE: 2
1. LITTLE INTEREST OR PLEASURE IN DOING THINGS: 3
SUM OF ALL RESPONSES TO PHQ QUESTIONS 1-9: 17
7. TROUBLE CONCENTRATING ON THINGS, SUCH AS READING THE NEWSPAPER OR WATCHING TELEVISION: 3
4. FEELING TIRED OR HAVING LITTLE ENERGY: 3
6. FEELING BAD ABOUT YOURSELF - OR THAT YOU ARE A FAILURE OR HAVE LET YOURSELF OR YOUR FAMILY DOWN: 3
2. FEELING DOWN, DEPRESSED OR HOPELESS: 3
SUM OF ALL RESPONSES TO PHQ QUESTIONS 1-9: 17
5. POOR APPETITE OR OVEREATING: 0

## 2022-04-21 ASSESSMENT — ENCOUNTER SYMPTOMS
ABDOMINAL PAIN: 0
WHEEZING: 0
BACK PAIN: 1
SHORTNESS OF BREATH: 0
RESPIRATORY NEGATIVE: 1

## 2022-04-21 NOTE — PATIENT INSTRUCTIONS
Patient Education        Carpal Tunnel Syndrome: Care Instructions  Overview     Carpal tunnel syndrome is numbness, tingling, weakness, and pain in your hand, wrist, and sometimes forearm. It is caused by pressure on the median nerve. This nerve and several tough tissues called tendons run through a space in thewrist. This space is called the carpal tunnel. The repeated hand motions used in work and some hobbies and sports can put pressure on the median nerve. Pregnancy can cause carpal tunnel syndrome. Several conditions, such as diabetes, arthritis, and an underactive thyroid,can also cause it. You may be able to limit an activity or change the way you do it to reduce your symptoms. You also can take other steps to feel better. If your symptoms are mild, 1 to 2 weeks of home treatment are likely to ease your pain. Surgery isneeded only if other treatments do not work. Follow-up care is a key part of your treatment and safety. Be sure to make and go to all appointments, and call your doctor if you are having problems. It's also a good idea to know your test results and keep alist of the medicines you take. How can you care for yourself at home?  If possible, stop or reduce the activity that causes your symptoms. If you cannot stop the activity, take frequent breaks to rest and stretch or change hand positions to do a task. Try switching hands, such as when using a computer mouse.  Try to avoid bending or twisting your wrists.  Ask your doctor if you can take an over-the-counter pain medicine, such as acetaminophen (Tylenol), ibuprofen (Advil, Motrin), or naproxen (Aleve). Be safe with medicines. Read and follow all instructions on the label.  If your doctor prescribes corticosteroid medicine to help reduce pain and swelling, take it exactly as prescribed. Call your doctor if you think you are having a problem with your medicine.    Put ice or a cold pack on your wrist for 10 to 20 minutes at a time to ease pain. Put a thin cloth between the ice and your skin.  If your doctor or your physical or occupational therapist tells you to wear a wrist splint, wear it as directed to keep your wrist in a neutral position. This also eases pressure on your median nerve.  Ask your doctor whether you should have physical or occupational therapy to learn how to do tasks differently.  Try a yoga class to stretch your muscles and build strength in your hands and wrists. Yoga has been shown to ease carpal tunnel symptoms. To prevent carpal tunnel   When working at a computer, keep your hands and wrists in line with your forearms. Hold your elbows close to your sides. Take a break every 10 to 15 minutes.  Try these exercises:  ? Warm up: Rotate your wrist up, down, and from side to side. Repeat this 4 times. Stretch your fingers far apart, relax them, then stretch them again. Repeat 4 times. Stretch your thumb by pulling it back gently, holding it, and then releasing it. Repeat 4 times. ? Prayer stretch: Start with your palms together in front of your chest just below your chin. Slowly lower your hands toward your waistline while keeping your hands close to your stomach and your palms together until you feel a mild to moderate stretch under your forearms. Hold for 10 to 20 seconds. Repeat 4 times. ? Wrist flexor stretch: Hold your arm in front of you with your palm up. Bend your wrist, pointing your hand toward the floor. With your other hand, gently bend your wrist further until you feel a mild to moderate stretch in your forearm. Hold for 10 to 20 seconds. Repeat 4 times. ? Wrist extensor stretch: Repeat the steps for the wrist flexor stretch, but begin with your extended hand palm down.  Squeeze a rubber exercise ball several times a day to keep your hands and fingers strong.  Avoid holding objects (such as a book) in one position for a long time. When possible, use your whole hand to grasp an object.  Using just the thumb and index finger can put stress on the wrist.   Do not smoke. It can make this condition worse by reducing blood flow to the median nerve. If you need help quitting, talk to your doctor about stop-smoking programs and medicines. These can increase your chances of quitting for good. When should you call for help? Watch closely for changes in your health, and be sure to contact your doctor if:     Your pain or other problems do not get better with home care.      You want more information about physical or occupational therapy.      You have side effects of your corticosteroid medicine, such as:  ? Weight gain. ? Mood changes. ? Trouble sleeping. ? Bruising easily.      You have any other problems with your medicine. Where can you learn more? Go to https://[facility]. org and sign in to your [employer] account. Enter R432 in the Comfy box to learn more about \"Carpal Tunnel Syndrome: Care Instructions. \"     If you do not have an account, please click on the \"Sign Up Now\" link. Current as of: July 1, 2021               Content Version: 13.2  © 8890-8332 Healthwise, Incorporated. Care instructions adapted under license by [facility] [place] ([facility]). If you have questions about a medical condition or this instruction, always ask your healthcare professional. [address] any warranty or liability for your use of this information.

## 2022-04-21 NOTE — PROGRESS NOTES
Visit Information    Have you changed or started any medications since your last visit including any over-the-counter medicines, vitamins, or herbal medicines? no   Have you stopped taking any of your medications? Is so, why? -  yes - lyrica, starting to feel in slow motion  Are you having any side effects from any of your medications? - no      Have you seen any other physician or provider since your last visit?  no   Have you had any other diagnostic tests since your last visit?  no   Have you been seen in the emergency room and/or had an admission in a hospital since we last saw you?  no   Have you had your routine dental cleaning in the past 6 months?  yes      Do you have an active MyChart account? If no, what is the barrier?   Yes    Patient Care Team:  Sandra Styles MD as PCP - General (Family Medicine)  Sandra Styles MD as PCP - [facility] Provider    Medical History Review  Past Medical, Family, and Social History reviewed and does contribute to the patient presenting condition    Health Maintenance   Topic Date Due    Pneumococcal 0-64 years Vaccine (1 - PCV) Never done    Diabetic foot exam  Never done    Diabetic microalbuminuria test  Never done    Diabetic retinal exam  Never done    Hepatitis C screen  Never done    Hepatitis B vaccine (1 of 3 - Risk 3-dose series) Never done    Cervical cancer screen  11/11/2020    COVID-19 Vaccine (2 - Booster for Марина series) 11/12/2021    A1C test (Diabetic or Prediabetic)  06/17/2022    Lipids  06/17/2022    Depression Monitoring  06/17/2022    Potassium  06/17/2022    Creatinine  06/17/2022    Flu vaccine (Season Ended) 09/01/2022    DTaP/Tdap/Td vaccine (2 - Td or Tdap) 04/03/2028    HIV screen  Completed    Hepatitis A vaccine  Aged Out    Hib vaccine  Aged Out    Meningococcal (ACWY) vaccine  Aged Out    Varicella vaccine  Discontinued

## 2022-05-14 DIAGNOSIS — F33.41 RECURRENT MAJOR DEPRESSIVE DISORDER, IN PARTIAL REMISSION (HCC): ICD-10-CM

## 2022-05-16 RX ORDER — VENLAFAXINE HYDROCHLORIDE 150 MG/1
CAPSULE, EXTENDED RELEASE ORAL
Qty: 90 CAPSULE | Refills: 0 | Status: SHIPPED | OUTPATIENT
Start: 2022-05-16

## 2022-05-16 RX ORDER — VENLAFAXINE HYDROCHLORIDE 75 MG/1
CAPSULE, EXTENDED RELEASE ORAL
Qty: 30 CAPSULE | Refills: 0 | Status: SHIPPED | OUTPATIENT
Start: 2022-05-16

## 2022-05-17 ENCOUNTER — HOSPITAL ENCOUNTER (OUTPATIENT)
Dept: NEUROLOGY | Age: 42
Discharge: HOME | End: 2022-05-17
Payer: COMMERCIAL

## 2022-05-17 DIAGNOSIS — G56.01 CARPAL TUNNEL SYNDROME OF RIGHT WRIST: ICD-10-CM

## 2022-05-17 PROCEDURE — 95909 NRV CNDJ TST 5-6 STUDIES: CPT | Performed by: PHYSICAL MEDICINE & REHABILITATION

## 2022-05-17 PROCEDURE — 95886 MUSC TEST DONE W/N TEST COMP: CPT | Performed by: PHYSICAL MEDICINE & REHABILITATION

## 2022-05-18 DIAGNOSIS — M54.41 CHRONIC MIDLINE LOW BACK PAIN WITH RIGHT-SIDED SCIATICA: ICD-10-CM

## 2022-05-18 DIAGNOSIS — I10 ESSENTIAL HYPERTENSION: ICD-10-CM

## 2022-05-18 DIAGNOSIS — G89.29 CHRONIC MIDLINE LOW BACK PAIN WITH RIGHT-SIDED SCIATICA: ICD-10-CM

## 2022-05-18 RX ORDER — LISINOPRIL 10 MG/1
TABLET ORAL
Qty: 90 TABLET | Refills: 0 | Status: SHIPPED | OUTPATIENT
Start: 2022-05-18

## 2022-05-18 RX ORDER — MELOXICAM 15 MG/1
TABLET ORAL
Qty: 60 TABLET | Refills: 0 | Status: SHIPPED | OUTPATIENT
Start: 2022-05-18

## 2022-06-13 DIAGNOSIS — F33.41 RECURRENT MAJOR DEPRESSIVE DISORDER, IN PARTIAL REMISSION (HCC): ICD-10-CM

## 2022-06-14 ENCOUNTER — TELEPHONE (OUTPATIENT)
Dept: FAMILY MEDICINE CLINIC | Age: 42
End: 2022-06-14

## 2022-06-14 RX ORDER — VENLAFAXINE HYDROCHLORIDE 75 MG/1
CAPSULE, EXTENDED RELEASE ORAL
Qty: 30 CAPSULE | Refills: 0 | OUTPATIENT
Start: 2022-06-14

## 2022-06-17 DIAGNOSIS — F33.41 RECURRENT MAJOR DEPRESSIVE DISORDER, IN PARTIAL REMISSION (HCC): ICD-10-CM

## 2022-06-17 DIAGNOSIS — E11.9 TYPE 2 DIABETES MELLITUS WITHOUT COMPLICATION, WITHOUT LONG-TERM CURRENT USE OF INSULIN (HCC): ICD-10-CM

## 2022-06-17 RX ORDER — METFORMIN HYDROCHLORIDE 500 MG/1
TABLET, EXTENDED RELEASE ORAL
Qty: 180 TABLET | Refills: 0 | OUTPATIENT
Start: 2022-06-17

## 2022-06-17 RX ORDER — VENLAFAXINE HYDROCHLORIDE 75 MG/1
CAPSULE, EXTENDED RELEASE ORAL
Qty: 30 CAPSULE | Refills: 0 | OUTPATIENT
Start: 2022-06-17

## 2024-10-30 ENCOUNTER — HOSPITAL ENCOUNTER (OUTPATIENT)
Age: 44
Setting detail: SPECIMEN
Discharge: HOME | End: 2024-10-30

## 2024-10-30 ENCOUNTER — OFFICE VISIT (OUTPATIENT)
Dept: OBGYN CLINIC | Age: 44
End: 2024-10-30
Payer: COMMERCIAL

## 2024-10-30 VITALS
BODY MASS INDEX: 29.25 KG/M2 | HEIGHT: 66 IN | WEIGHT: 182 LBS | DIASTOLIC BLOOD PRESSURE: 95 MMHG | HEART RATE: 86 BPM | SYSTOLIC BLOOD PRESSURE: 140 MMHG

## 2024-10-30 DIAGNOSIS — Z12.31 SCREENING MAMMOGRAM FOR BREAST CANCER: ICD-10-CM

## 2024-10-30 DIAGNOSIS — B96.89 BV (BACTERIAL VAGINOSIS): ICD-10-CM

## 2024-10-30 DIAGNOSIS — Z01.419 WELL WOMAN EXAM WITH ROUTINE GYNECOLOGICAL EXAM: Primary | ICD-10-CM

## 2024-10-30 DIAGNOSIS — Z01.419 WELL WOMAN EXAM WITH ROUTINE GYNECOLOGICAL EXAM: ICD-10-CM

## 2024-10-30 DIAGNOSIS — N89.8 VAGINAL DISCHARGE: ICD-10-CM

## 2024-10-30 DIAGNOSIS — N76.0 BV (BACTERIAL VAGINOSIS): ICD-10-CM

## 2024-10-30 DIAGNOSIS — Z12.11 ENCOUNTER FOR SCREENING COLONOSCOPY: ICD-10-CM

## 2024-10-30 DIAGNOSIS — Z30.41 ENCOUNTER FOR SURVEILLANCE OF CONTRACEPTIVE PILLS: ICD-10-CM

## 2024-10-30 DIAGNOSIS — Z80.0 FAMILY HISTORY OF COLON CANCER: ICD-10-CM

## 2024-10-30 PROCEDURE — 3077F SYST BP >= 140 MM HG: CPT | Performed by: STUDENT IN AN ORGANIZED HEALTH CARE EDUCATION/TRAINING PROGRAM

## 2024-10-30 PROCEDURE — 3080F DIAST BP >= 90 MM HG: CPT | Performed by: STUDENT IN AN ORGANIZED HEALTH CARE EDUCATION/TRAINING PROGRAM

## 2024-10-30 RX ORDER — NORETHINDRONE 0.35 MG
1 KIT ORAL DAILY
Qty: 90 TABLET | Refills: 3 | Status: SHIPPED | OUTPATIENT
Start: 2024-10-30

## 2024-10-30 NOTE — PROGRESS NOTES
Luisa English  10/30/2024              44 y.o.  Chief Complaint   Patient presents with    New Patient         Patient's last menstrual period was 10/17/2024.           Primary Care Physician: Deisi Leyva APRN - MIKEL    HPI : Luisa English is a 44 y.o. female     Patient is here today for her well women annual exam. She was seen and examined. Patient has no chief complaint today. She switched GYN due to insurance. She is on birth control pills to regulate her cycle. She is now bleeding once a month. Reports she has some vaginal discharge/stinging that feels a little abnormal to her. She is sexually active. She has noticed some bumps along her labia too. Her mom had colon cancer diagnosed at her age and the patient has not started her screening colonoscopies yet. She has had 3 prior vaginal deliveries all around 33 weeks. Denies any bad vaginal lacerations or preeclampsia during those pregnancies.   ________________________________________________________________________  OB History    Para Term  AB Living   3 3 0 3 0 3   SAB IAB Ectopic Molar Multiple Live Births   0 0 0   0 3      # Outcome Date GA Lbr Chris/2nd Weight Sex Type Anes PTL Lv   3  /    F    GAGAN   2  00    M    GAGAN   1  97    M    GAGAN     Past Medical History:   Diagnosis Date    Abnormal Pap smear of cervix 2022    Anxiety and depression     Chronic back pain     Chronic low back pain with right-sided sciatica     Class 1 obesity due to excess calories without serious comorbidity with body mass index (BMI) of 33.0 to 33.9 in adult 2019    Depression     Drug effect 2020    Essential hypertension 2018    Fibromyalgia 10/08/2019    STD (sexually transmitted disease) 2022    High risk hpv                                                                   Past Surgical History:   Procedure Laterality Date    CHOLECYSTECTOMY      L/S    FRACTURE SURGERY

## 2024-10-31 LAB
C TRACH DNA SPEC QL PROBE+SIG AMP: NEGATIVE
CANDIDA SPECIES: NEGATIVE
GARDNERELLA VAGINALIS: POSITIVE
N GONORRHOEA DNA SPEC QL PROBE+SIG AMP: NEGATIVE
SOURCE: ABNORMAL
SPECIMEN DESCRIPTION: NORMAL
TRICHOMONAS: NEGATIVE

## 2024-11-01 LAB
HPV I/H RISK 4 DNA CVX QL NAA+PROBE: NOT DETECTED
HPV SAMPLE: NORMAL
HPV, INTERPRETATION: NORMAL
HPV16 DNA CVX QL NAA+PROBE: NOT DETECTED
HPV18 DNA CVX QL NAA+PROBE: NOT DETECTED
SPECIMEN DESCRIPTION: NORMAL

## 2024-11-01 RX ORDER — METRONIDAZOLE 500 MG/1
500 TABLET ORAL 2 TIMES DAILY
Qty: 14 TABLET | Refills: 0 | Status: SHIPPED | OUTPATIENT
Start: 2024-11-01 | End: 2024-11-08

## 2024-11-02 LAB — CYTOLOGY REPORT: NORMAL

## 2024-11-05 ENCOUNTER — TELEPHONE (OUTPATIENT)
Dept: GASTROENTEROLOGY | Age: 44
End: 2024-11-05

## 2024-12-27 SDOH — HEALTH STABILITY: PHYSICAL HEALTH: ON AVERAGE, HOW MANY MINUTES DO YOU ENGAGE IN EXERCISE AT THIS LEVEL?: 60 MIN

## 2024-12-27 SDOH — HEALTH STABILITY: PHYSICAL HEALTH: ON AVERAGE, HOW MANY DAYS PER WEEK DO YOU ENGAGE IN MODERATE TO STRENUOUS EXERCISE (LIKE A BRISK WALK)?: 5 DAYS

## 2024-12-30 ENCOUNTER — OFFICE VISIT (OUTPATIENT)
Dept: FAMILY MEDICINE CLINIC | Age: 44
End: 2024-12-30
Payer: COMMERCIAL

## 2024-12-30 VITALS
HEART RATE: 80 BPM | WEIGHT: 181 LBS | BODY MASS INDEX: 29.09 KG/M2 | HEIGHT: 66 IN | DIASTOLIC BLOOD PRESSURE: 103 MMHG | SYSTOLIC BLOOD PRESSURE: 162 MMHG

## 2024-12-30 DIAGNOSIS — Z13.6 SCREENING FOR CARDIOVASCULAR CONDITION: ICD-10-CM

## 2024-12-30 DIAGNOSIS — M79.7 FIBROMYALGIA: ICD-10-CM

## 2024-12-30 DIAGNOSIS — G89.29 CHRONIC HIP PAIN, RIGHT: ICD-10-CM

## 2024-12-30 DIAGNOSIS — M25.551 CHRONIC HIP PAIN, RIGHT: ICD-10-CM

## 2024-12-30 DIAGNOSIS — E11.9 TYPE 2 DIABETES MELLITUS WITHOUT COMPLICATION, WITHOUT LONG-TERM CURRENT USE OF INSULIN (HCC): ICD-10-CM

## 2024-12-30 DIAGNOSIS — Z76.89 ENCOUNTER TO ESTABLISH CARE: Primary | ICD-10-CM

## 2024-12-30 DIAGNOSIS — F33.41 RECURRENT MAJOR DEPRESSIVE DISORDER, IN PARTIAL REMISSION (HCC): ICD-10-CM

## 2024-12-30 DIAGNOSIS — I10 ESSENTIAL HYPERTENSION: ICD-10-CM

## 2024-12-30 DIAGNOSIS — M25.50 PAIN IN JOINT INVOLVING MULTIPLE SITES: ICD-10-CM

## 2024-12-30 DIAGNOSIS — M54.41 CHRONIC MIDLINE LOW BACK PAIN WITH RIGHT-SIDED SCIATICA: ICD-10-CM

## 2024-12-30 DIAGNOSIS — G89.29 CHRONIC MIDLINE LOW BACK PAIN WITH RIGHT-SIDED SCIATICA: ICD-10-CM

## 2024-12-30 DIAGNOSIS — Z11.59 NEED FOR HEPATITIS C SCREENING TEST: ICD-10-CM

## 2024-12-30 PROBLEM — B02.9 SHINGLES: Status: RESOLVED | Noted: 2021-05-06 | Resolved: 2024-12-30

## 2024-12-30 PROBLEM — B37.2 CANDIDAL INTERTRIGO: Status: RESOLVED | Noted: 2021-06-17 | Resolved: 2024-12-30

## 2024-12-30 LAB — HBA1C MFR BLD: 5.9 %

## 2024-12-30 PROCEDURE — 3044F HG A1C LEVEL LT 7.0%: CPT | Performed by: NURSE PRACTITIONER

## 2024-12-30 PROCEDURE — 3077F SYST BP >= 140 MM HG: CPT | Performed by: NURSE PRACTITIONER

## 2024-12-30 PROCEDURE — 83036 HEMOGLOBIN GLYCOSYLATED A1C: CPT | Performed by: NURSE PRACTITIONER

## 2024-12-30 PROCEDURE — 3080F DIAST BP >= 90 MM HG: CPT | Performed by: NURSE PRACTITIONER

## 2024-12-30 PROCEDURE — 99204 OFFICE O/P NEW MOD 45 MIN: CPT | Performed by: NURSE PRACTITIONER

## 2024-12-30 RX ORDER — METFORMIN HYDROCHLORIDE 500 MG/1
500 TABLET, EXTENDED RELEASE ORAL
Qty: 60 TABLET | Refills: 3 | Status: SHIPPED | OUTPATIENT
Start: 2024-12-30

## 2024-12-30 RX ORDER — VENLAFAXINE HYDROCHLORIDE 75 MG/1
CAPSULE, EXTENDED RELEASE ORAL
Qty: 30 CAPSULE | Refills: 1 | Status: SHIPPED | OUTPATIENT
Start: 2024-12-30

## 2024-12-30 RX ORDER — VALSARTAN 80 MG/1
80 TABLET ORAL DAILY
Qty: 30 TABLET | Refills: 5 | Status: SHIPPED | OUTPATIENT
Start: 2024-12-30

## 2024-12-30 RX ORDER — MELOXICAM 15 MG/1
TABLET ORAL
Qty: 30 TABLET | Refills: 1 | Status: SHIPPED | OUTPATIENT
Start: 2024-12-30

## 2024-12-30 SDOH — ECONOMIC STABILITY: FOOD INSECURITY: WITHIN THE PAST 12 MONTHS, THE FOOD YOU BOUGHT JUST DIDN'T LAST AND YOU DIDN'T HAVE MONEY TO GET MORE.: NEVER TRUE

## 2024-12-30 SDOH — ECONOMIC STABILITY: FOOD INSECURITY: WITHIN THE PAST 12 MONTHS, YOU WORRIED THAT YOUR FOOD WOULD RUN OUT BEFORE YOU GOT MONEY TO BUY MORE.: NEVER TRUE

## 2024-12-30 SDOH — ECONOMIC STABILITY: INCOME INSECURITY: HOW HARD IS IT FOR YOU TO PAY FOR THE VERY BASICS LIKE FOOD, HOUSING, MEDICAL CARE, AND HEATING?: NOT HARD AT ALL

## 2024-12-30 ASSESSMENT — ENCOUNTER SYMPTOMS
ABDOMINAL PAIN: 0
COLOR CHANGE: 0
SHORTNESS OF BREATH: 0
CONSTIPATION: 0
DIARRHEA: 0
CHEST TIGHTNESS: 0

## 2024-12-30 ASSESSMENT — PATIENT HEALTH QUESTIONNAIRE - PHQ9
SUM OF ALL RESPONSES TO PHQ9 QUESTIONS 1 & 2: 6
5. POOR APPETITE OR OVEREATING: NOT AT ALL
4. FEELING TIRED OR HAVING LITTLE ENERGY: NEARLY EVERY DAY
6. FEELING BAD ABOUT YOURSELF - OR THAT YOU ARE A FAILURE OR HAVE LET YOURSELF OR YOUR FAMILY DOWN: NEARLY EVERY DAY
SUM OF ALL RESPONSES TO PHQ QUESTIONS 1-9: 18
SUM OF ALL RESPONSES TO PHQ QUESTIONS 1-9: 18
2. FEELING DOWN, DEPRESSED OR HOPELESS: NEARLY EVERY DAY
SUM OF ALL RESPONSES TO PHQ QUESTIONS 1-9: 18
8. MOVING OR SPEAKING SO SLOWLY THAT OTHER PEOPLE COULD HAVE NOTICED. OR THE OPPOSITE, BEING SO FIGETY OR RESTLESS THAT YOU HAVE BEEN MOVING AROUND A LOT MORE THAN USUAL: NOT AT ALL
SUM OF ALL RESPONSES TO PHQ QUESTIONS 1-9: 18
3. TROUBLE FALLING OR STAYING ASLEEP: NEARLY EVERY DAY
7. TROUBLE CONCENTRATING ON THINGS, SUCH AS READING THE NEWSPAPER OR WATCHING TELEVISION: NEARLY EVERY DAY
10. IF YOU CHECKED OFF ANY PROBLEMS, HOW DIFFICULT HAVE THESE PROBLEMS MADE IT FOR YOU TO DO YOUR WORK, TAKE CARE OF THINGS AT HOME, OR GET ALONG WITH OTHER PEOPLE: EXTREMELY DIFFICULT
9. THOUGHTS THAT YOU WOULD BE BETTER OFF DEAD, OR OF HURTING YOURSELF: NOT AT ALL
1. LITTLE INTEREST OR PLEASURE IN DOING THINGS: NEARLY EVERY DAY

## 2024-12-30 NOTE — PROGRESS NOTES
1 tablet by mouth every 8 hours as needed (muscle spasm), Disp-30 tablet, R-0Normal  -     meloxicam (MOBIC) 15 MG tablet; TAKE 1 TABLET BY MOUTH EVERY DAY AS NEEDED FOR back PAIN, Disp-30 tablet, R-1Normal  8. Essential hypertension  -     valsartan (DIOVAN) 80 MG tablet; Take 1 tablet by mouth daily, Disp-30 tablet, R-5Normal  9. Chronic hip pain, right  -     meloxicam (MOBIC) 15 MG tablet; TAKE 1 TABLET BY MOUTH EVERY DAY AS NEEDED FOR back PAIN, Disp-30 tablet, R-1Normal  -     XR HIP 2-3 VW W PELVIS RIGHT; Future  -     XR LUMBAR SPINE (MIN 4 VIEWS); Future  10. Need for hepatitis C screening test  -     Hepatitis C Antibody; Future       Restart medication as previous.   Obtain x-rays of low back and right hip. Continue PT exercises. Consider referral to pain management.   She is able to monitor blood pressure at home and work and encouraged to do so after starting valsartan daily.   Encouraged efforts at healthy lifestyle with nutritious diet and regular physical activity.   Understanding and agreement was voiced with all above plans. All questions answered to satisfaction.   Call office with any questions or new or worsening symptoms or concerns.     Return in about 4 weeks (around 1/27/2025), or if symptoms worsen or fail to improve, for chronic conditions, Med Check.      The patient (or guardian, if applicable) and other individuals in attendance with the patient were advised that Artificial Intelligence will be utilized during this visit to record, process the conversation to generate a clinical note, and support improvement of the AI technology. The patient (or guardian, if applicable) and other individuals in attendance at the appointment consented to the use of AI, including the recording.        Electronically signed by STEVE MURPHY CNP on 12/30/2024 at 8:55 PM    Note is dictated utilizing voice recognition software. Unfortunately this leads to occasional typographical errors. Please

## 2025-01-24 ENCOUNTER — HOSPITAL ENCOUNTER (OUTPATIENT)
Dept: GENERAL RADIOLOGY | Age: 45
Discharge: HOME | End: 2025-01-26
Payer: COMMERCIAL

## 2025-01-24 ENCOUNTER — HOSPITAL ENCOUNTER (OUTPATIENT)
Age: 45
Discharge: HOME | End: 2025-01-26
Payer: COMMERCIAL

## 2025-01-24 ENCOUNTER — HOSPITAL ENCOUNTER (OUTPATIENT)
Age: 45
Discharge: HOME | End: 2025-01-24
Payer: COMMERCIAL

## 2025-01-24 DIAGNOSIS — M79.7 FIBROMYALGIA: ICD-10-CM

## 2025-01-24 DIAGNOSIS — Z13.6 SCREENING FOR CARDIOVASCULAR CONDITION: ICD-10-CM

## 2025-01-24 DIAGNOSIS — Z11.59 NEED FOR HEPATITIS C SCREENING TEST: ICD-10-CM

## 2025-01-24 DIAGNOSIS — M54.41 CHRONIC MIDLINE LOW BACK PAIN WITH RIGHT-SIDED SCIATICA: ICD-10-CM

## 2025-01-24 DIAGNOSIS — E11.9 TYPE 2 DIABETES MELLITUS WITHOUT COMPLICATION, WITHOUT LONG-TERM CURRENT USE OF INSULIN (HCC): ICD-10-CM

## 2025-01-24 DIAGNOSIS — Z76.89 ENCOUNTER TO ESTABLISH CARE: ICD-10-CM

## 2025-01-24 DIAGNOSIS — G89.29 CHRONIC MIDLINE LOW BACK PAIN WITH RIGHT-SIDED SCIATICA: ICD-10-CM

## 2025-01-24 DIAGNOSIS — M25.551 CHRONIC HIP PAIN, RIGHT: ICD-10-CM

## 2025-01-24 DIAGNOSIS — G89.29 CHRONIC HIP PAIN, RIGHT: ICD-10-CM

## 2025-01-24 LAB
25(OH)D3 SERPL-MCNC: 12.4 NG/ML (ref 30–100)
ALBUMIN SERPL-MCNC: 4.1 G/DL (ref 3.5–5.2)
ALP SERPL-CCNC: 52 U/L (ref 35–104)
ALT SERPL-CCNC: 12 U/L (ref 10–35)
ANION GAP SERPL CALCULATED.3IONS-SCNC: 8 MMOL/L (ref 9–16)
AST SERPL-CCNC: 14 U/L (ref 10–35)
BILIRUB SERPL-MCNC: 0.3 MG/DL (ref 0–1.2)
BUN SERPL-MCNC: 6 MG/DL (ref 6–20)
CALCIUM SERPL-MCNC: 8.7 MG/DL (ref 8.6–10.4)
CHLORIDE SERPL-SCNC: 104 MMOL/L (ref 98–107)
CHOLEST SERPL-MCNC: 137 MG/DL (ref 0–199)
CHOLESTEROL/HDL RATIO: 2.7
CO2 SERPL-SCNC: 26 MMOL/L (ref 20–31)
CREAT SERPL-MCNC: 0.7 MG/DL (ref 0.7–1.2)
ERYTHROCYTE [DISTWIDTH] IN BLOOD BY AUTOMATED COUNT: 12.9 % (ref 11.5–14.9)
GFR, ESTIMATED: >90 ML/MIN/1.73M2
GLUCOSE P FAST SERPL-MCNC: 127 MG/DL (ref 74–99)
HCT VFR BLD AUTO: 41.6 % (ref 36–46)
HCV AB SERPL QL IA: NONREACTIVE
HDLC SERPL-MCNC: 51 MG/DL
HGB BLD-MCNC: 14.3 G/DL (ref 12–16)
LDLC SERPL CALC-MCNC: 75 MG/DL (ref 0–100)
MCH RBC QN AUTO: 30 PG (ref 26–34)
MCHC RBC AUTO-ENTMCNC: 34.3 G/DL (ref 31–37)
MCV RBC AUTO: 87.5 FL (ref 80–100)
PLATELET # BLD AUTO: 219 K/UL (ref 150–450)
PMV BLD AUTO: 7.4 FL (ref 6–12)
POTASSIUM SERPL-SCNC: 4.2 MMOL/L (ref 3.7–5.3)
PROT SERPL-MCNC: 6.4 G/DL (ref 6.6–8.7)
RBC # BLD AUTO: 4.75 M/UL (ref 4–5.2)
SODIUM SERPL-SCNC: 138 MMOL/L (ref 136–145)
TRIGL SERPL-MCNC: 53 MG/DL (ref 0–149)
TSH SERPL DL<=0.05 MIU/L-ACNC: 1.85 UIU/ML (ref 0.27–4.2)
WBC OTHER # BLD: 5.8 K/UL (ref 3.5–11)

## 2025-01-24 PROCEDURE — 72110 X-RAY EXAM L-2 SPINE 4/>VWS: CPT

## 2025-01-24 PROCEDURE — 80053 COMPREHEN METABOLIC PANEL: CPT

## 2025-01-24 PROCEDURE — 85027 COMPLETE CBC AUTOMATED: CPT

## 2025-01-24 PROCEDURE — 73502 X-RAY EXAM HIP UNI 2-3 VIEWS: CPT

## 2025-01-24 PROCEDURE — 36415 COLL VENOUS BLD VENIPUNCTURE: CPT

## 2025-01-24 PROCEDURE — 82306 VITAMIN D 25 HYDROXY: CPT

## 2025-01-24 PROCEDURE — 86803 HEPATITIS C AB TEST: CPT

## 2025-01-24 PROCEDURE — 80061 LIPID PANEL: CPT

## 2025-01-24 PROCEDURE — 84443 ASSAY THYROID STIM HORMONE: CPT

## 2025-01-28 SDOH — ECONOMIC STABILITY: FOOD INSECURITY: WITHIN THE PAST 12 MONTHS, THE FOOD YOU BOUGHT JUST DIDN'T LAST AND YOU DIDN'T HAVE MONEY TO GET MORE.: NEVER TRUE

## 2025-01-28 SDOH — ECONOMIC STABILITY: INCOME INSECURITY: IN THE LAST 12 MONTHS, WAS THERE A TIME WHEN YOU WERE NOT ABLE TO PAY THE MORTGAGE OR RENT ON TIME?: NO

## 2025-01-28 SDOH — ECONOMIC STABILITY: FOOD INSECURITY: WITHIN THE PAST 12 MONTHS, YOU WORRIED THAT YOUR FOOD WOULD RUN OUT BEFORE YOU GOT MONEY TO BUY MORE.: NEVER TRUE

## 2025-01-28 ASSESSMENT — ENCOUNTER SYMPTOMS
ABDOMINAL PAIN: 0
SHORTNESS OF BREATH: 0
COLOR CHANGE: 0
DIARRHEA: 0
CONSTIPATION: 0
CHEST TIGHTNESS: 0

## 2025-01-28 NOTE — PROGRESS NOTES
Luisa English, was evaluated through a synchronous (real-time) audio-video encounter. The patient (or guardian if applicable) is aware that this is a billable service, which includes applicable co-pays. This Virtual Visit was conducted with patient's (and/or legal guardian's) consent. Patient identification was verified, and a caregiver was present when appropriate.   The patient was located at Home: [address] [place] [address]  Provider was located at Facility (Appt Dept): [address] [facility] [address]  [place] [address]  Confirm you are appropriately licensed, registered, or certified to deliver care in the state where the patient is located as indicated above. If you are not or unsure, please re-schedule the visit: Yes, I confirm.     Luisa English (:  1980) is a Established patient, presenting virtually for evaluation of the following:      Below is the assessment and plan developed based on review of pertinent history, physical exam, labs, studies, and medications.     Assessment & Plan  Essential hypertension    Will try increasing valsartan to 1.5 tablets (120 mg) daily.  She will continue to monitor blood pressure at work and advise of readings.     Orders:    valsartan (DIOVAN) 80 MG tablet; Take 1.5 tablets by mouth daily    Recurrent major depressive disorder, in partial remission (HCC)   Chronic, at goal (stable), she will continue Effexor at 75 mg daily for now.  PHQ-9 improved from 18 to 8.    Orders:    venlafaxine (EFFEXOR XR) 75 MG extended release capsule; TAKE 1 CAPSULE BY MOUTH EVERY DAY    Recurrent major depressive disorder, in partial remission (HCC)       Orders:    venlafaxine (EFFEXOR XR) 75 MG extended release capsule; TAKE 1 CAPSULE BY MOUTH EVERY DAY    Fibromyalgia    Tizanidine has helped, especially at night, however still having pain.  Has taken Lyrica in the past with improvement in symptoms.  Will restart dosing.    Orders:    tiZANidine

## 2025-01-29 ENCOUNTER — TELEMEDICINE (OUTPATIENT)
Dept: FAMILY MEDICINE CLINIC | Age: 45
End: 2025-01-29

## 2025-01-29 DIAGNOSIS — I10 ESSENTIAL HYPERTENSION: Primary | ICD-10-CM

## 2025-01-29 DIAGNOSIS — F33.41 RECURRENT MAJOR DEPRESSIVE DISORDER, IN PARTIAL REMISSION (HCC): ICD-10-CM

## 2025-01-29 DIAGNOSIS — M79.7 FIBROMYALGIA: ICD-10-CM

## 2025-01-29 DIAGNOSIS — E55.9 VITAMIN D DEFICIENCY: ICD-10-CM

## 2025-01-29 DIAGNOSIS — G89.29 CHRONIC MIDLINE LOW BACK PAIN WITH RIGHT-SIDED SCIATICA: ICD-10-CM

## 2025-01-29 DIAGNOSIS — M54.41 CHRONIC MIDLINE LOW BACK PAIN WITH RIGHT-SIDED SCIATICA: ICD-10-CM

## 2025-01-29 DIAGNOSIS — G89.29 CHRONIC HIP PAIN, RIGHT: ICD-10-CM

## 2025-01-29 DIAGNOSIS — M25.551 CHRONIC HIP PAIN, RIGHT: ICD-10-CM

## 2025-01-29 DIAGNOSIS — M25.50 PAIN IN JOINT INVOLVING MULTIPLE SITES: ICD-10-CM

## 2025-01-29 PROBLEM — F33.2 MAJOR DEPRESSIVE DISORDER, RECURRENT, SEVERE WITHOUT PSYCHOTIC FEATURES (HCC): Status: RESOLVED | Noted: 2018-04-03 | Resolved: 2025-01-29

## 2025-01-29 RX ORDER — ERGOCALCIFEROL 1.25 MG/1
50000 CAPSULE, LIQUID FILLED ORAL WEEKLY
Qty: 12 CAPSULE | Refills: 1 | Status: SHIPPED | OUTPATIENT
Start: 2025-01-29

## 2025-01-29 RX ORDER — VALSARTAN 80 MG/1
120 TABLET ORAL DAILY
Qty: 135 TABLET | Refills: 1 | Status: SHIPPED | OUTPATIENT
Start: 2025-01-29

## 2025-01-29 RX ORDER — MELOXICAM 15 MG/1
TABLET ORAL
Qty: 90 TABLET | Refills: 1 | Status: SHIPPED | OUTPATIENT
Start: 2025-01-29

## 2025-01-29 RX ORDER — PREGABALIN 100 MG/1
100 CAPSULE ORAL 2 TIMES DAILY
Qty: 60 CAPSULE | Refills: 0 | Status: SHIPPED | OUTPATIENT
Start: 2025-01-29 | End: 2025-02-28

## 2025-01-29 RX ORDER — VENLAFAXINE HYDROCHLORIDE 75 MG/1
CAPSULE, EXTENDED RELEASE ORAL
Qty: 90 CAPSULE | Refills: 1 | Status: SHIPPED | OUTPATIENT
Start: 2025-01-29

## 2025-01-29 ASSESSMENT — PATIENT HEALTH QUESTIONNAIRE - PHQ9
SUM OF ALL RESPONSES TO PHQ QUESTIONS 1-9: 8
1. LITTLE INTEREST OR PLEASURE IN DOING THINGS: MORE THAN HALF THE DAYS
8. MOVING OR SPEAKING SO SLOWLY THAT OTHER PEOPLE COULD HAVE NOTICED. OR THE OPPOSITE, BEING SO FIGETY OR RESTLESS THAT YOU HAVE BEEN MOVING AROUND A LOT MORE THAN USUAL: NOT AT ALL
10. IF YOU CHECKED OFF ANY PROBLEMS, HOW DIFFICULT HAVE THESE PROBLEMS MADE IT FOR YOU TO DO YOUR WORK, TAKE CARE OF THINGS AT HOME, OR GET ALONG WITH OTHER PEOPLE: SOMEWHAT DIFFICULT
5. POOR APPETITE OR OVEREATING: NOT AT ALL
SUM OF ALL RESPONSES TO PHQ QUESTIONS 1-9: 8
SUM OF ALL RESPONSES TO PHQ9 QUESTIONS 1 & 2: 4
3. TROUBLE FALLING OR STAYING ASLEEP: SEVERAL DAYS
4. FEELING TIRED OR HAVING LITTLE ENERGY: SEVERAL DAYS
2. FEELING DOWN, DEPRESSED OR HOPELESS: MORE THAN HALF THE DAYS
SUM OF ALL RESPONSES TO PHQ QUESTIONS 1-9: 8
SUM OF ALL RESPONSES TO PHQ QUESTIONS 1-9: 8
6. FEELING BAD ABOUT YOURSELF - OR THAT YOU ARE A FAILURE OR HAVE LET YOURSELF OR YOUR FAMILY DOWN: NOT AT ALL
9. THOUGHTS THAT YOU WOULD BE BETTER OFF DEAD, OR OF HURTING YOURSELF: NOT AT ALL
7. TROUBLE CONCENTRATING ON THINGS, SUCH AS READING THE NEWSPAPER OR WATCHING TELEVISION: MORE THAN HALF THE DAYS

## 2025-01-29 NOTE — ASSESSMENT & PLAN NOTE
See above.  Referral provided to PT at [facility] for hip evaluation and treatment.    Orders:    tiZANidine (ZANAFLEX) 4 MG tablet; Take 1 tablet by mouth every 8 hours as needed (muscle spasm)    meloxicam (MOBIC) 15 MG tablet; TAKE 1 TABLET BY MOUTH EVERY DAY AS NEEDED FOR back PAIN    [facility]    pregabalin (LYRICA) 100 MG capsule; Take 1 capsule by mouth 2 times daily for 30 days. Max Daily Amount: 200 mg

## 2025-01-29 NOTE — ASSESSMENT & PLAN NOTE
See above.    Orders:    tiZANidine (ZANAFLEX) 4 MG tablet; Take 1 tablet by mouth every 8 hours as needed (muscle spasm)    meloxicam (MOBIC) 15 MG tablet; TAKE 1 TABLET BY MOUTH EVERY DAY AS NEEDED FOR back PAIN

## 2025-01-29 NOTE — ASSESSMENT & PLAN NOTE
Orders:    venlafaxine (EFFEXOR XR) 75 MG extended release capsule; TAKE 1 CAPSULE BY MOUTH EVERY DAY

## 2025-01-29 NOTE — ASSESSMENT & PLAN NOTE
Chronic, at goal (stable), she will continue Effexor at 75 mg daily for now.  PHQ-9 improved from 18 to 8.    Orders:    venlafaxine (EFFEXOR XR) 75 MG extended release capsule; TAKE 1 CAPSULE BY MOUTH EVERY DAY

## 2025-01-29 NOTE — ASSESSMENT & PLAN NOTE
Tizanidine has helped, especially at night, however still having pain.  Has taken Lyrica in the past with improvement in symptoms.  Will restart dosing.    Orders:    tiZANidine (ZANAFLEX) 4 MG tablet; Take 1 tablet by mouth every 8 hours as needed (muscle spasm)    pregabalin (LYRICA) 100 MG capsule; Take 1 capsule by mouth 2 times daily for 30 days. Max Daily Amount: 200 mg

## 2025-06-26 DIAGNOSIS — Z30.41 ENCOUNTER FOR SURVEILLANCE OF CONTRACEPTIVE PILLS: ICD-10-CM

## 2025-06-30 RX ORDER — NORETHINDRONE 0.35 MG
1 KIT ORAL DAILY
Qty: 30 TABLET | Refills: 0 | Status: SHIPPED | OUTPATIENT
Start: 2025-06-30

## 2025-07-05 DIAGNOSIS — Z30.41 ENCOUNTER FOR SURVEILLANCE OF CONTRACEPTIVE PILLS: ICD-10-CM

## 2025-07-07 RX ORDER — NORETHINDRONE 0.35 MG
1 KIT ORAL DAILY
Qty: 28 TABLET | Refills: 4 | Status: SHIPPED | OUTPATIENT
Start: 2025-07-07